# Patient Record
Sex: MALE | Race: WHITE | NOT HISPANIC OR LATINO | Employment: OTHER | URBAN - METROPOLITAN AREA
[De-identification: names, ages, dates, MRNs, and addresses within clinical notes are randomized per-mention and may not be internally consistent; named-entity substitution may affect disease eponyms.]

---

## 2017-02-09 ENCOUNTER — OFFICE VISIT (OUTPATIENT)
Dept: RETAIL CLINIC | Facility: CLINIC | Age: 72
End: 2017-02-09

## 2017-02-09 VITALS
HEIGHT: 69 IN | BODY MASS INDEX: 29.21 KG/M2 | WEIGHT: 197.2 LBS | RESPIRATION RATE: 20 BRPM | DIASTOLIC BLOOD PRESSURE: 68 MMHG | SYSTOLIC BLOOD PRESSURE: 128 MMHG

## 2017-02-09 DIAGNOSIS — E78.6 LOW HDL (UNDER 40): ICD-10-CM

## 2017-02-09 DIAGNOSIS — E11.65 POORLY CONTROLLED DIABETES MELLITUS (HCC): ICD-10-CM

## 2017-02-09 DIAGNOSIS — E78.1 HIGH BLOOD TRIGLYCERIDES: ICD-10-CM

## 2017-02-09 DIAGNOSIS — Z13.220 ENCOUNTER FOR SCREENING FOR LIPID DISORDER: Primary | ICD-10-CM

## 2017-02-09 LAB
CHOLEST SERPL-MCNC: 185 MG/DL
EXPIRATION DATE: NORMAL
GLUCOSE BLDC GLUCOMTR-MCNC: 272 MG/DL (ref 70–130)
HDLC SERPL-MCNC: 25 MG/DL
LDLC SERPL CALC-MCNC: NORMAL MG/DL
Lab: NORMAL
TRIGL SERPL-MCNC: 500 MG/DL

## 2017-02-09 PROCEDURE — BIOSCRNBEC: Performed by: NURSE PRACTITIONER

## 2017-02-09 NOTE — PROGRESS NOTES
S: Would like cholesterol panel checked. Recently called with lab results and told his total cholesterol was over 1000 and questions it. Has appointment for physical tomorrow with PCP.    O: Well appearing   Vitals:    02/09/17 1433   BP: 128/68   Resp: 20       Results for orders placed or performed in visit on 02/09/17   POC Lipid Panel   Result Value Ref Range    Total Cholesterol 185 mg/dL    Triglycerides 500 mg/dL    HDL Cholesterol 25 mg/dL    LDL Cholesterol   mg/dL    Lot Number [id number]     Expiration Date 06/20/2017    POC Glucose Fingerstick   Result Value Ref Range    Glucose 272 (A) 70 - 130 mg/dL     TC/HDL  7.5  Non HDL  160    A:Elevated triglycerides, low HDL with hx of CAD     Poorly controlled diabetes    P: Results discussed Follow up with PCP as scheduled.

## 2020-12-14 ENCOUNTER — TELEPHONE (OUTPATIENT)
Dept: ENDOCRINOLOGY | Facility: CLINIC | Age: 75
End: 2020-12-14

## 2020-12-14 NOTE — TELEPHONE ENCOUNTER
Patient called and had to cancel due to feeling sick. Said he recently had his a1c drawn, and it was around 6.3, but that we could call his provider and have those results sent to us. He said to call them at [phone]

## 2020-12-30 ENCOUNTER — TRANSCRIBE ORDERS (OUTPATIENT)
Dept: ADMINISTRATIVE | Facility: HOSPITAL | Age: 75
End: 2020-12-30

## 2020-12-30 DIAGNOSIS — I20.0 UNSTABLE ANGINA (HCC): Primary | ICD-10-CM

## 2020-12-30 DIAGNOSIS — R94.39 ABNORMAL STRESS TEST: ICD-10-CM

## 2021-01-02 ENCOUNTER — APPOINTMENT (OUTPATIENT)
Dept: PREADMISSION TESTING | Facility: HOSPITAL | Age: 76
End: 2021-01-02

## 2021-01-02 PROCEDURE — U0004 COV-19 TEST NON-CDC HGH THRU: HCPCS

## 2021-01-02 PROCEDURE — C9803 HOPD COVID-19 SPEC COLLECT: HCPCS

## 2021-01-03 LAB — SARS-COV-2 RNA RESP QL NAA+PROBE: NOT DETECTED

## 2021-01-04 ENCOUNTER — HOSPITAL ENCOUNTER (OUTPATIENT)
Facility: HOSPITAL | Age: 76
Setting detail: HOSPITAL OUTPATIENT SURGERY
Discharge: HOME | End: 2021-01-04
Attending: INTERNAL MEDICINE | Admitting: INTERNAL MEDICINE

## 2021-01-04 VITALS
DIASTOLIC BLOOD PRESSURE: 95 MMHG | OXYGEN SATURATION: 94 % | RESPIRATION RATE: 18 BRPM | BODY MASS INDEX: 30.53 KG/M2 | HEART RATE: 84 BPM | HEIGHT: 69 IN | TEMPERATURE: 97.6 F | SYSTOLIC BLOOD PRESSURE: 131 MMHG | WEIGHT: 206.13 LBS

## 2021-01-04 DIAGNOSIS — R94.39 ABNORMAL STRESS TEST: ICD-10-CM

## 2021-01-04 DIAGNOSIS — I20.0 UNSTABLE ANGINA (HCC): ICD-10-CM

## 2021-01-04 LAB
ANION GAP SERPL CALCULATED.3IONS-SCNC: 12 MMOL/L (ref 5–15)
BUN SERPL-MCNC: 21 MG/DL (ref 8–23)
BUN/CREAT SERPL: 23.1 (ref 7–25)
CALCIUM SPEC-SCNC: 10.1 MG/DL (ref 8.6–10.5)
CHLORIDE SERPL-SCNC: 105 MMOL/L (ref 98–107)
CO2 SERPL-SCNC: 21 MMOL/L (ref 22–29)
CREAT SERPL-MCNC: 0.91 MG/DL (ref 0.76–1.27)
DEPRECATED RDW RBC AUTO: 46 FL (ref 37–54)
ERYTHROCYTE [DISTWIDTH] IN BLOOD BY AUTOMATED COUNT: 13.8 % (ref 12.3–15.4)
GFR SERPL CREATININE-BSD FRML MDRD: 82 ML/MIN/1.73
GLUCOSE BLDC GLUCOMTR-MCNC: 109 MG/DL (ref 70–130)
GLUCOSE SERPL-MCNC: 149 MG/DL (ref 65–99)
HCT VFR BLD AUTO: 45.7 % (ref 37.5–51)
HGB BLD-MCNC: 13.8 G/DL (ref 13–17.7)
MCH RBC QN AUTO: 27.7 PG (ref 26.6–33)
MCHC RBC AUTO-ENTMCNC: 30.2 G/DL (ref 31.5–35.7)
MCV RBC AUTO: 91.6 FL (ref 79–97)
PLATELET # BLD AUTO: 185 10*3/MM3 (ref 140–450)
PMV BLD AUTO: 8.9 FL (ref 6–12)
POTASSIUM SERPL-SCNC: 4.9 MMOL/L (ref 3.5–5.2)
RBC # BLD AUTO: 4.99 10*6/MM3 (ref 4.14–5.8)
SODIUM SERPL-SCNC: 138 MMOL/L (ref 136–145)
WBC # BLD AUTO: 4.29 10*3/MM3 (ref 3.4–10.8)

## 2021-01-04 PROCEDURE — C1887 CATHETER, GUIDING: HCPCS | Performed by: INTERNAL MEDICINE

## 2021-01-04 PROCEDURE — 82962 GLUCOSE BLOOD TEST: CPT

## 2021-01-04 PROCEDURE — 92978 ENDOLUMINL IVUS OCT C 1ST: CPT | Performed by: INTERNAL MEDICINE

## 2021-01-04 PROCEDURE — 80048 BASIC METABOLIC PNL TOTAL CA: CPT | Performed by: INTERNAL MEDICINE

## 2021-01-04 PROCEDURE — 25010000002 BIVALIRUDIN TRIFLUOROACETATE 250 MG RECONSTITUTED SOLUTION 1 EACH VIAL: Performed by: INTERNAL MEDICINE

## 2021-01-04 PROCEDURE — 93005 ELECTROCARDIOGRAM TRACING: CPT | Performed by: INTERNAL MEDICINE

## 2021-01-04 PROCEDURE — 92937 PRQ TRLUML REVSC CAB GRF 1: CPT | Performed by: INTERNAL MEDICINE

## 2021-01-04 PROCEDURE — 25010000002 MIDAZOLAM PER 1 MG: Performed by: INTERNAL MEDICINE

## 2021-01-04 PROCEDURE — C1753 CATH, INTRAVAS ULTRASOUND: HCPCS | Performed by: INTERNAL MEDICINE

## 2021-01-04 PROCEDURE — C1725 CATH, TRANSLUMIN NON-LASER: HCPCS | Performed by: INTERNAL MEDICINE

## 2021-01-04 PROCEDURE — C1894 INTRO/SHEATH, NON-LASER: HCPCS | Performed by: INTERNAL MEDICINE

## 2021-01-04 PROCEDURE — 85027 COMPLETE CBC AUTOMATED: CPT | Performed by: INTERNAL MEDICINE

## 2021-01-04 PROCEDURE — C1769 GUIDE WIRE: HCPCS | Performed by: INTERNAL MEDICINE

## 2021-01-04 PROCEDURE — 25010000002 HEPARIN (PORCINE) PER 1000 UNITS: Performed by: INTERNAL MEDICINE

## 2021-01-04 PROCEDURE — 25010000002 FENTANYL CITRATE (PF) 100 MCG/2ML SOLUTION: Performed by: INTERNAL MEDICINE

## 2021-01-04 PROCEDURE — 93459 L HRT ART/GRFT ANGIO: CPT | Performed by: INTERNAL MEDICINE

## 2021-01-04 PROCEDURE — 0 IOPAMIDOL PER 1 ML: Performed by: INTERNAL MEDICINE

## 2021-01-04 RX ORDER — ISOSORBIDE MONONITRATE 30 MG/1
60 TABLET, EXTENDED RELEASE ORAL DAILY
COMMUNITY
End: 2022-03-24

## 2021-01-04 RX ORDER — SODIUM CHLORIDE 9 MG/ML
250 INJECTION, SOLUTION INTRAVENOUS CONTINUOUS
Status: ACTIVE | OUTPATIENT
Start: 2021-01-04 | End: 2021-01-04

## 2021-01-04 RX ORDER — RANOLAZINE 500 MG/1
1000 TABLET, EXTENDED RELEASE ORAL 2 TIMES DAILY
Qty: 60 TABLET | Refills: 5 | Status: ON HOLD | OUTPATIENT
Start: 2021-01-04 | End: 2021-06-25

## 2021-01-04 RX ORDER — CLOPIDOGREL BISULFATE 75 MG/1
75 TABLET ORAL DAILY
Qty: 30 TABLET | Refills: 11 | Status: SHIPPED | OUTPATIENT
Start: 2021-01-04

## 2021-01-04 RX ORDER — ASPIRIN 81 MG/1
81 TABLET ORAL DAILY
COMMUNITY

## 2021-01-04 RX ORDER — FENTANYL CITRATE 50 UG/ML
INJECTION, SOLUTION INTRAMUSCULAR; INTRAVENOUS AS NEEDED
Status: DISCONTINUED | OUTPATIENT
Start: 2021-01-04 | End: 2021-01-04 | Stop reason: HOSPADM

## 2021-01-04 RX ORDER — MIDAZOLAM HYDROCHLORIDE 1 MG/ML
INJECTION INTRAMUSCULAR; INTRAVENOUS AS NEEDED
Status: DISCONTINUED | OUTPATIENT
Start: 2021-01-04 | End: 2021-01-04 | Stop reason: HOSPADM

## 2021-01-04 RX ORDER — POTASSIUM CHLORIDE 750 MG/1
20 TABLET, FILM COATED, EXTENDED RELEASE ORAL DAILY
Status: ON HOLD | COMMUNITY
End: 2021-06-25

## 2021-01-04 RX ORDER — GABAPENTIN 800 MG/1
800 TABLET ORAL 4 TIMES DAILY
COMMUNITY

## 2021-01-04 RX ORDER — CARVEDILOL 3.12 MG/1
3.12 TABLET ORAL 2 TIMES DAILY WITH MEALS
COMMUNITY

## 2021-01-04 RX ORDER — RANOLAZINE 500 MG/1
500 TABLET, EXTENDED RELEASE ORAL 2 TIMES DAILY
Status: ON HOLD | COMMUNITY
End: 2021-01-04 | Stop reason: SDUPTHER

## 2021-01-04 RX ORDER — CLOPIDOGREL BISULFATE 75 MG/1
TABLET ORAL AS NEEDED
Status: DISCONTINUED | OUTPATIENT
Start: 2021-01-04 | End: 2021-01-04 | Stop reason: HOSPADM

## 2021-01-04 RX ORDER — TEMAZEPAM 7.5 MG/1
7.5 CAPSULE ORAL NIGHTLY PRN
Status: DISCONTINUED | OUTPATIENT
Start: 2021-01-04 | End: 2021-01-04 | Stop reason: HOSPADM

## 2021-01-04 RX ORDER — OMEPRAZOLE 20 MG/1
20 CAPSULE, DELAYED RELEASE ORAL DAILY
Status: ON HOLD | COMMUNITY
End: 2021-06-25

## 2021-01-04 RX ORDER — LEVOTHYROXINE SODIUM 0.05 MG/1
50 TABLET ORAL DAILY
Status: ON HOLD | COMMUNITY
End: 2021-06-25

## 2021-01-04 RX ORDER — HYDROCODONE BITARTRATE AND ACETAMINOPHEN 10; 325 MG/1; MG/1
1 TABLET ORAL 3 TIMES DAILY
COMMUNITY

## 2021-01-04 RX ORDER — DEXTROSE MONOHYDRATE 25 G/50ML
25 INJECTION, SOLUTION INTRAVENOUS
Status: DISCONTINUED | OUTPATIENT
Start: 2021-01-04 | End: 2021-01-04 | Stop reason: HOSPADM

## 2021-01-04 RX ORDER — ASPIRIN 325 MG
325 TABLET, DELAYED RELEASE (ENTERIC COATED) ORAL DAILY
Status: DISCONTINUED | OUTPATIENT
Start: 2021-01-04 | End: 2021-01-04 | Stop reason: HOSPADM

## 2021-01-04 RX ORDER — ATORVASTATIN CALCIUM 80 MG/1
80 TABLET, FILM COATED ORAL NIGHTLY
COMMUNITY

## 2021-01-04 RX ORDER — LISINOPRIL 2.5 MG/1
2.5 TABLET ORAL DAILY
COMMUNITY
End: 2022-03-24 | Stop reason: SDUPTHER

## 2021-01-04 RX ORDER — HYDROCODONE BITARTRATE AND ACETAMINOPHEN 5; 325 MG/1; MG/1
1 TABLET ORAL EVERY 4 HOURS PRN
Status: DISCONTINUED | OUTPATIENT
Start: 2021-01-04 | End: 2021-01-04 | Stop reason: HOSPADM

## 2021-01-04 RX ORDER — NICOTINE POLACRILEX 4 MG
15 LOZENGE BUCCAL
Status: DISCONTINUED | OUTPATIENT
Start: 2021-01-04 | End: 2021-01-04 | Stop reason: HOSPADM

## 2021-01-04 RX ORDER — ACETAMINOPHEN 325 MG/1
650 TABLET ORAL EVERY 4 HOURS PRN
Status: DISCONTINUED | OUTPATIENT
Start: 2021-01-04 | End: 2021-01-04 | Stop reason: HOSPADM

## 2021-01-04 RX ORDER — ALPRAZOLAM 0.25 MG/1
0.25 TABLET ORAL 3 TIMES DAILY PRN
Status: DISCONTINUED | OUTPATIENT
Start: 2021-01-04 | End: 2021-01-04 | Stop reason: HOSPADM

## 2021-01-04 RX ORDER — LIDOCAINE HYDROCHLORIDE 10 MG/ML
INJECTION, SOLUTION EPIDURAL; INFILTRATION; INTRACAUDAL; PERINEURAL AS NEEDED
Status: DISCONTINUED | OUTPATIENT
Start: 2021-01-04 | End: 2021-01-04 | Stop reason: HOSPADM

## 2021-01-04 RX ADMIN — ASPIRIN 325 MG: 325 TABLET, COATED ORAL at 13:12

## 2021-01-04 NOTE — H&P
Pre-Cardiac Catheterization Report  Cardiovascular Laboratory  [facility]      Patient:  Remington Alvarado  :  1945  PCP:  Provider, No Known  PHONE:  None    DATE: 2021    BRIEF HPI:  Remington Alvarado is a 75 y.o. male with hypertension, hypercholesterolemia, GERD, diabetes mellitus, and known coronary artery disease.  He is post previous four-vessel and 5 vessel CABG's.  He is complaining of chest pain that is been increasing for the past several weeks he describes it as a pressure.  He states it is moderate severity lasting minutes with associated shortness of breath and dyspnea on exertion.  His symptoms increased with exertion and are decreased with rest.  He recently underwent an abnormal stress test and now presents for left heart catheterization with possible intervention.    Cardiac Risk Factors:  advanced age (older than 55 for men, 65 for women), diabetes mellitus, dyslipidemia, family history of premature cardiovascular disease, hypertension, male gender    Anginal class in last 2 weeks:  CCS class III    CHF Class in last 2 weeks:  NYHA Class II    Cardiogenic shock:  no    Cardiac arrest <24 hours:  no    Stress test within last 6 months:   yes   Details:    Previous cardiac catheterization:  yes  Details:     Previous CABG:  yes  Details:      Allergies:     IV contrast allergy:  no  Allergies   Allergen Reactions   • Sulfa Antibiotics        MEDICATIONS:  Prior to Admission medications    Medication Sig Start Date End Date Taking? Authorizing Provider   acyclovir (ZOVIRAX) 400 MG tablet Take 1 tablet by mouth 3 (Three) Times a Day. Take no more than 5 doses a day. 20   Gita Galindo APRN   Empagliflozin (JARDIANCE PO) Take  by mouth.    Emergency, Nurse Epic, RN   insulin NPH-insulin regular (humuLIN 70/30,novoLIN 70/30) (70-30) 100 UNIT/ML injection Inject  under the skin into the appropriate area as directed 2 (Two) Times a Day With Meals.    Emergency, Nurse Dima RN    metFORMIN (GLUCOPHAGE) 1000 MG tablet Take 1 tablet by mouth 2 (Two) Times a Day With Meals. 12/29/20   Tobias Fay MD   Miracle mouthwash oral suspension Swish and spit 10 mL Every 4 (Four) Hours As Needed for Stomatitis. 9/6/20   Gita Galindo APRN       Past medical & surgical history, social and family history reviewed in the electronic medical record.    ROS:  Cardiovascular ROS: positive for - chest pain, dyspnea on exertion and shortness of breath    Physical Exam:    Vitals: There were no vitals filed for this visit. There were no vitals filed for this visit.    General Appearance:    Alert, cooperative, in no acute distress   Head:    Normocephalic, without obvious abnormality, atraumatic   Eyes:            Lids and lashes normal, conjunctivae and sclerae normal, no   icterus, no pallor, corneas clear, PERRLA   Ears:    Ears appear intact with no abnormalities noted   Neck:   No adenopathy, supple, trachea midline, no thyromegaly, +   carotid bruits, no JVD   Back:     No kyphosis present, no scoliosis present, range of motion normal   Lungs:     Clear to auscultation,respirations regular, even and                unlabored    Heart:    Regular rhythm and normal rate, normal S1 and S2, no         murmur, no gallop, no rub, no click.  Lateral PMI   Chest Wall:    No abnormalities observed   Abdomen:     Normal bowel sounds, no masses, no organomegaly, soft     nontender, nondistended, no guarding, no rebound                tenderness   Rectal:     Deferred   Extremities:   Moves all extremities well, no edema, no cyanosis, no           redness   Pulses:   Pulses palpable and equal bilaterally   Skin:   No bleeding, bruising or rash   Neurologic:   Cranial nerves 2 - 12 grossly intact, sensation intact     Barbaeu Test:  Left: Normal  (oxymetric Allens) Right: Not Assessed             Lab Results   Component Value Date    TRIG 500 02/09/2017    HDL 25 02/09/2017           Impression       · Abnormal stress test    Plan     · Procedure to perform: [facility]  · Planned access: Left radial artery              SCOTT Valente  01/04/21  12:35 EST

## 2021-01-05 ENCOUNTER — DOCUMENTATION (OUTPATIENT)
Dept: CARDIAC REHAB | Facility: HOSPITAL | Age: 76
End: 2021-01-05

## 2021-01-05 ENCOUNTER — CALL CENTER PROGRAMS (OUTPATIENT)
Dept: CALL CENTER | Facility: HOSPITAL | Age: 76
End: 2021-01-05

## 2021-01-05 NOTE — PROGRESS NOTES
Pt. Referred for Phase II Cardiac Rehab. Staff discussed benefits of exercise, program protocol, and educational material provided. Teach back verified.  Patient states that he will contact staff at a later date to schedule an appointment.

## 2021-01-05 NOTE — OUTREACH NOTE
PCI/Device Survey      Responses   Facility patient discharged from?  [facility]   Procedure date  01/04/21   Procedure (if device, specify in description)  PCI   PCI site  Left, Arm   Performing MD Dr. Man Arreguin   Attempt successful?  Yes   Call start time  1138   Call end time  1148   Has the patient had any of the following symptoms since discharge?  -- [Denies any symptoms. ]   Is the patient taking prescribed medications:  ASA, Plavix [patient reports that he will  the plavix from pharmacy shortly. ]   Nursing intervention  Reminded to continue to take prescribed medications   Does the patient have any of the following symptoms related to the cath/surgical site?  -- [Patient reports dressing remains clean and intact this am. ]   Does the patient have an appointment scheduled with the cardiologist?  No   Appointment comments  Patient reports waiting to hear from Dr Arreguin's office with appt.    If the patient is a current smoker, are they able to teach back resources for cessation?  Not a smoker   Did the patient feel prepared to go home on the same day as the procedure?  Yes   Is the patient satisfied with the same day discharge process?  Yes   PCI/Device call completed  Yes   Wrap up additional comments  Patient reports that he is doing well. Denies any questions or concerns today.           Chastity Chetser RN

## 2021-01-06 LAB
QT INTERVAL: 356 MS
QTC INTERVAL: 410 MS

## 2021-01-15 RX ORDER — EMPAGLIFLOZIN 25 MG/1
25 TABLET, FILM COATED ORAL DAILY
Qty: 30 TABLET | Refills: 3 | Status: SHIPPED | OUTPATIENT
Start: 2021-01-15 | End: 2021-04-27

## 2021-04-27 RX ORDER — EMPAGLIFLOZIN 25 MG/1
1 TABLET, FILM COATED ORAL DAILY
Qty: 90 TABLET | Refills: 0 | Status: SHIPPED | OUTPATIENT
Start: 2021-04-27 | End: 2021-07-26

## 2021-06-21 ENCOUNTER — TRANSCRIBE ORDERS (OUTPATIENT)
Dept: ADMINISTRATIVE | Facility: HOSPITAL | Age: 76
End: 2021-06-21

## 2021-06-21 DIAGNOSIS — R94.39 ABNORMAL STRESS TEST: Primary | ICD-10-CM

## 2021-06-25 ENCOUNTER — HOSPITAL ENCOUNTER (OUTPATIENT)
Facility: HOSPITAL | Age: 76
Setting detail: HOSPITAL OUTPATIENT SURGERY
Discharge: HOME | End: 2021-06-25
Attending: INTERNAL MEDICINE | Admitting: INTERNAL MEDICINE

## 2021-06-25 VITALS
HEART RATE: 66 BPM | RESPIRATION RATE: 18 BRPM | HEIGHT: 69 IN | TEMPERATURE: 97 F | WEIGHT: 208.11 LBS | OXYGEN SATURATION: 94 % | SYSTOLIC BLOOD PRESSURE: 152 MMHG | BODY MASS INDEX: 30.82 KG/M2 | DIASTOLIC BLOOD PRESSURE: 89 MMHG

## 2021-06-25 DIAGNOSIS — R94.39 ABNORMAL STRESS TEST: ICD-10-CM

## 2021-06-25 LAB
ANION GAP SERPL CALCULATED.3IONS-SCNC: 10 MMOL/L (ref 5–15)
BUN SERPL-MCNC: 17 MG/DL (ref 8–23)
BUN/CREAT SERPL: 17.2 (ref 7–25)
CALCIUM SPEC-SCNC: 9.5 MG/DL (ref 8.6–10.5)
CHLORIDE SERPL-SCNC: 102 MMOL/L (ref 98–107)
CO2 SERPL-SCNC: 24 MMOL/L (ref 22–29)
CREAT SERPL-MCNC: 0.99 MG/DL (ref 0.76–1.27)
DEPRECATED RDW RBC AUTO: 46.2 FL (ref 37–54)
ERYTHROCYTE [DISTWIDTH] IN BLOOD BY AUTOMATED COUNT: 13.2 % (ref 12.3–15.4)
GFR SERPL CREATININE-BSD FRML MDRD: 74 ML/MIN/1.73
GLUCOSE BLDC GLUCOMTR-MCNC: 111 MG/DL (ref 70–130)
GLUCOSE SERPL-MCNC: 144 MG/DL (ref 65–99)
HCT VFR BLD AUTO: 42.5 % (ref 37.5–51)
HGB BLD-MCNC: 13.4 G/DL (ref 13–17.7)
MCH RBC QN AUTO: 29.7 PG (ref 26.6–33)
MCHC RBC AUTO-ENTMCNC: 31.5 G/DL (ref 31.5–35.7)
MCV RBC AUTO: 94.2 FL (ref 79–97)
PLATELET # BLD AUTO: 170 10*3/MM3 (ref 140–450)
PMV BLD AUTO: 9.2 FL (ref 6–12)
POTASSIUM SERPL-SCNC: 4.6 MMOL/L (ref 3.5–5.2)
RBC # BLD AUTO: 4.51 10*6/MM3 (ref 4.14–5.8)
SODIUM SERPL-SCNC: 136 MMOL/L (ref 136–145)
WBC # BLD AUTO: 4.29 10*3/MM3 (ref 3.4–10.8)

## 2021-06-25 PROCEDURE — C1887 CATHETER, GUIDING: HCPCS | Performed by: INTERNAL MEDICINE

## 2021-06-25 PROCEDURE — 92978 ENDOLUMINL IVUS OCT C 1ST: CPT | Performed by: INTERNAL MEDICINE

## 2021-06-25 PROCEDURE — C1769 GUIDE WIRE: HCPCS | Performed by: INTERNAL MEDICINE

## 2021-06-25 PROCEDURE — 80048 BASIC METABOLIC PNL TOTAL CA: CPT

## 2021-06-25 PROCEDURE — 25010000002 FENTANYL CITRATE (PF) 50 MCG/ML SOLUTION: Performed by: INTERNAL MEDICINE

## 2021-06-25 PROCEDURE — C1725 CATH, TRANSLUMIN NON-LASER: HCPCS | Performed by: INTERNAL MEDICINE

## 2021-06-25 PROCEDURE — 25010000002 BIVALIRUDIN TRIFLUOROACETATE 250 MG RECONSTITUTED SOLUTION 1 EACH VIAL: Performed by: INTERNAL MEDICINE

## 2021-06-25 PROCEDURE — 82962 GLUCOSE BLOOD TEST: CPT

## 2021-06-25 PROCEDURE — C1894 INTRO/SHEATH, NON-LASER: HCPCS | Performed by: INTERNAL MEDICINE

## 2021-06-25 PROCEDURE — 0 IOPAMIDOL PER 1 ML: Performed by: INTERNAL MEDICINE

## 2021-06-25 PROCEDURE — 93455 CORONARY ART/GRFT ANGIO S&I: CPT | Performed by: INTERNAL MEDICINE

## 2021-06-25 PROCEDURE — C1753 CATH, INTRAVAS ULTRASOUND: HCPCS | Performed by: INTERNAL MEDICINE

## 2021-06-25 PROCEDURE — C9604 PERC D-E COR REVASC T CABG S: HCPCS | Performed by: INTERNAL MEDICINE

## 2021-06-25 PROCEDURE — 85027 COMPLETE CBC AUTOMATED: CPT

## 2021-06-25 PROCEDURE — 93005 ELECTROCARDIOGRAM TRACING: CPT | Performed by: INTERNAL MEDICINE

## 2021-06-25 PROCEDURE — 25010000002 MIDAZOLAM PER 1 MG: Performed by: INTERNAL MEDICINE

## 2021-06-25 RX ORDER — HYDROCODONE BITARTRATE AND ACETAMINOPHEN 5; 325 MG/1; MG/1
1 TABLET ORAL EVERY 4 HOURS PRN
Status: DISCONTINUED | OUTPATIENT
Start: 2021-06-25 | End: 2021-06-25 | Stop reason: HOSPADM

## 2021-06-25 RX ORDER — ALPRAZOLAM 0.25 MG/1
0.25 TABLET ORAL 3 TIMES DAILY PRN
Status: DISCONTINUED | OUTPATIENT
Start: 2021-06-25 | End: 2021-06-25 | Stop reason: HOSPADM

## 2021-06-25 RX ORDER — LIDOCAINE HYDROCHLORIDE 10 MG/ML
INJECTION, SOLUTION EPIDURAL; INFILTRATION; INTRACAUDAL; PERINEURAL AS NEEDED
Status: DISCONTINUED | OUTPATIENT
Start: 2021-06-25 | End: 2021-06-25 | Stop reason: HOSPADM

## 2021-06-25 RX ORDER — ACETAMINOPHEN 325 MG/1
650 TABLET ORAL EVERY 4 HOURS PRN
Status: DISCONTINUED | OUTPATIENT
Start: 2021-06-25 | End: 2021-06-25 | Stop reason: HOSPADM

## 2021-06-25 RX ORDER — OMEPRAZOLE 40 MG/1
40 CAPSULE, DELAYED RELEASE ORAL DAILY
COMMUNITY

## 2021-06-25 RX ORDER — RANOLAZINE 1000 MG/1
1000 TABLET, EXTENDED RELEASE ORAL 2 TIMES DAILY
COMMUNITY

## 2021-06-25 RX ORDER — TEMAZEPAM 7.5 MG/1
7.5 CAPSULE ORAL NIGHTLY PRN
Status: DISCONTINUED | OUTPATIENT
Start: 2021-06-25 | End: 2021-06-25 | Stop reason: HOSPADM

## 2021-06-25 RX ORDER — LEVOTHYROXINE SODIUM 0.07 MG/1
75 TABLET ORAL DAILY
COMMUNITY
Start: 2021-04-16 | End: 2021-07-27 | Stop reason: SDUPTHER

## 2021-06-25 RX ORDER — ZOLPIDEM TARTRATE 5 MG/1
5 TABLET ORAL NIGHTLY PRN
COMMUNITY
Start: 2021-04-12

## 2021-06-25 RX ORDER — POTASSIUM CHLORIDE 20 MEQ/1
20 TABLET, EXTENDED RELEASE ORAL DAILY
COMMUNITY
Start: 2021-04-01

## 2021-06-25 RX ORDER — EZETIMIBE 10 MG/1
10 TABLET ORAL DAILY
COMMUNITY
Start: 2021-04-27

## 2021-06-25 RX ORDER — DIAZEPAM 2 MG/1
2 TABLET ORAL ONCE
Status: COMPLETED | OUTPATIENT
Start: 2021-06-25 | End: 2021-06-25

## 2021-06-25 RX ORDER — ASPIRIN 325 MG
325 TABLET, DELAYED RELEASE (ENTERIC COATED) ORAL DAILY
Status: DISCONTINUED | OUTPATIENT
Start: 2021-06-25 | End: 2021-06-25 | Stop reason: HOSPADM

## 2021-06-25 RX ORDER — NITROGLYCERIN 0.4 MG/1
0.4 TABLET SUBLINGUAL
COMMUNITY
Start: 2021-04-06

## 2021-06-25 RX ORDER — DEXTROSE MONOHYDRATE 25 G/50ML
25 INJECTION, SOLUTION INTRAVENOUS
Status: DISCONTINUED | OUTPATIENT
Start: 2021-06-25 | End: 2021-06-25 | Stop reason: HOSPADM

## 2021-06-25 RX ORDER — FENTANYL CITRATE 50 UG/ML
INJECTION, SOLUTION INTRAMUSCULAR; INTRAVENOUS AS NEEDED
Status: DISCONTINUED | OUTPATIENT
Start: 2021-06-25 | End: 2021-06-25 | Stop reason: HOSPADM

## 2021-06-25 RX ORDER — MIDAZOLAM HYDROCHLORIDE 1 MG/ML
INJECTION INTRAMUSCULAR; INTRAVENOUS AS NEEDED
Status: DISCONTINUED | OUTPATIENT
Start: 2021-06-25 | End: 2021-06-25 | Stop reason: HOSPADM

## 2021-06-25 RX ORDER — SODIUM CHLORIDE 9 MG/ML
250 INJECTION, SOLUTION INTRAVENOUS CONTINUOUS
Status: ACTIVE | OUTPATIENT
Start: 2021-06-25 | End: 2021-06-25

## 2021-06-25 RX ORDER — NICOTINE POLACRILEX 4 MG
15 LOZENGE BUCCAL
Status: DISCONTINUED | OUTPATIENT
Start: 2021-06-25 | End: 2021-06-25 | Stop reason: HOSPADM

## 2021-06-25 RX ADMIN — HYDROCODONE BITARTRATE AND ACETAMINOPHEN 1 TABLET: 5; 325 TABLET ORAL at 10:52

## 2021-06-25 RX ADMIN — ASPIRIN 325 MG: 325 TABLET, COATED ORAL at 07:55

## 2021-06-25 RX ADMIN — DIAZEPAM 2 MG: 2 TABLET ORAL at 07:57

## 2021-06-25 NOTE — H&P
Pre-Cardiac Catheterization Report  Cardiovascular Laboratory  [facility]      Patient:  Remington Alvarado  :  1945  PCP:  Provider, No Known  PHONE:  None    DATE: 2021    BRIEF HPI:  Remington Alvarado is a 75 y.o. male with hypertension, hypercholesterolemia, diabetes mellitus, family history of coronary artery disease, and known coronary artery disease.  He is post previous CABG.  He has been complaining of chest pain which she describes as tight.  He states it is moderate in severity lasting minutes with associated shortness of breath and dyspnea on exertion.  He recently underwent an abnormal stress test and now presents for left heart catheterization with possible intervention.    Cardiac Risk Factors:  advanced age (older than 55 for men, 65 for women), diabetes mellitus, dyslipidemia, family history of premature cardiovascular disease, hypertension, male gender    Anginal class in last 2 weeks:  CCS class II    CHF Class in last 2 weeks:  NYHA Class II    Cardiogenic shock:  no    Cardiac arrest <24 hours:  no    Stress test within last 6 months:   yes   Details:    Previous cardiac catheterization:  yes  Details:     Previous CABG:  yes  Details:      Allergies:     IV contrast allergy:  no  Allergies   Allergen Reactions   • Sulfa Antibiotics        MEDICATIONS:  Prior to Admission medications    Medication Sig Start Date End Date Taking? Authorizing Provider   acyclovir (ZOVIRAX) 400 MG tablet Take 1 tablet by mouth 3 (Three) Times a Day. Take no more than 5 doses a day. 20   Gita Galindo APRN   aspirin 81 MG EC tablet Take 81 mg by mouth Daily.    Provider, MD Travis   atorvastatin (LIPITOR) 80 MG tablet Take 80 mg by mouth Daily.    Provider, MD Travis   carvedilol (COREG) 3.125 MG tablet Take 3.125 mg by mouth 2 (Two) Times a Day With Meals.    Provider, MD Travis   clopidogrel (PLAVIX) 75 MG tablet Take 1 tablet by mouth Daily. 21   Martin Arreguin  MD Man   Empagliflozin (Jardiance) 25 MG tablet Take 25 mg by mouth Daily. NEEDS AN APPT FOR REFILLS 4/27/21   Tobias Fay MD   gabapentin (NEURONTIN) 800 MG tablet Take 800 mg by mouth 4 (Four) Times a Day.    Travis Portillo MD   HYDROcodone-acetaminophen (NORCO)  MG per tablet Take 1 tablet by mouth 3 (Three) Times a Day.    Travis Portillo MD   insulin NPH-insulin regular (humuLIN 70/30,novoLIN 70/30) (70-30) 100 UNIT/ML injection Inject 68 Units under the skin into the appropriate area as directed 2 (Two) Times a Day With Meals.    Emergency, Nurse Epic, RN   isosorbide mononitrate (IMDUR) 30 MG 24 hr tablet Take 30 mg by mouth Daily.    Travis Portillo MD   levothyroxine (SYNTHROID, LEVOTHROID) 50 MCG tablet Take 50 mcg by mouth Daily.    Travis Portillo MD   lisinopril (PRINIVIL,ZESTRIL) 2.5 MG tablet Take 2.5 mg by mouth Daily.    Travis Portillo MD   metFORMIN (GLUCOPHAGE) 1000 MG tablet TAKE 1 TABLET BY MOUTH TWICE DAILY WITH MEALS 1/29/21   Tobias Fay MD   Miracle mouthwash oral suspension Swish and spit 10 mL Every 4 (Four) Hours As Needed for Stomatitis. 9/6/20   Gita Galindo APRN   omeprazole (priLOSEC) 20 MG capsule Take 20 mg by mouth Daily.    Travis Portillo MD   potassium chloride 10 MEQ CR tablet Take 20 mEq by mouth Daily.    Travis Portillo MD   ranolazine (RANEXA) 500 MG 12 hr tablet Take 2 tablets by mouth 2 (Two) Times a Day. 1/4/21   Martin Arreguin MD       Past medical & surgical history, social and family history reviewed in the electronic medical record.    ROS:  Cardiovascular ROS: positive for - chest pain, dyspnea on exertion and shortness of breath    Physical Exam:    Vitals: There were no vitals filed for this visit. There were no vitals filed for this visit.    General Appearance:    Alert, cooperative, in no acute distress   Head:    Normocephalic, without obvious abnormality, atraumatic    Eyes:            Lids and lashes normal, conjunctivae and sclerae normal, no   icterus, no pallor, corneas clear, PERRLA   Ears:    Ears appear intact with no abnormalities noted   Neck:   No adenopathy, supple, trachea midline, no thyromegaly, no   carotid bruit, no JVD   Back:     No kyphosis present, no scoliosis present, range of motion normal   Lungs:     Clear to auscultation,respirations regular, even and                unlabored    Heart:    Regular rhythm and normal rate, normal S1 and S2, no         murmur, no gallop, no rub, no click   Chest Wall:    No abnormalities observed   Abdomen:     Normal bowel sounds, no masses, no organomegaly, soft     nontender, nondistended, no guarding, no rebound                tenderness   Rectal:     Deferred   Extremities:   Moves all extremities well, no edema, no cyanosis, no           redness   Pulses:   Pulses palpable and equal bilaterally   Skin:   No bleeding, bruising or rash   Neurologic:   Cranial nerves 2 - 12 grossly intact, sensation intact     Barbaeu Test:  Left: Normal  (oxymetric Allens) Right: Not Assessed             Lab Results   Component Value Date    TRIG 500 02/09/2017    HDL 25 02/09/2017           Impression      · Abnormal stress test    Plan     · Procedure to perform: [facility]  · Planned access: Left radial artery              SCOTT Valente   06/25/21  07:09 EDT

## 2021-06-28 ENCOUNTER — CALL CENTER PROGRAMS (OUTPATIENT)
Dept: CALL CENTER | Facility: HOSPITAL | Age: 76
End: 2021-06-28

## 2021-06-28 LAB
QT INTERVAL: 406 MS
QTC INTERVAL: 431 MS

## 2021-06-28 NOTE — OUTREACH NOTE
PCI/Device Survey      Responses   Facility patient discharged from?  [facility]   Procedure date  06/25/21   Procedure (if device, specify in description)  PCI   PCI site  Left, Arm   Performing MD Dr. Man Arreguin   Attempt successful?  Yes   Call start time  1219   Call end time  1222   Nursing interventions  Advised to call MD, Advised to call 911   Is the patient taking prescribed medications:  ASA, Plavix   Nursing intervention  Reminded to continue to take prescribed medications, Nurse provided patient education   Does the patient have any of the following symptoms related to the cath/surgical site?  Bruising, Unusal pain at the site, Redness, warmth, or swelling at the site, Drainage (other than a small amount of blood on the dressing), Bleeding that does not stop after 30 minutes of holding steady pressure on the site, Arm, hand or leg pale, cool, tingly or numb, Fever   Nursing intervention  Patient education provided   Does the patient have an appointment scheduled with the cardiologist?  Yes   Appointment comments  Office will with appt.   If the patient is a current smoker, are they able to teach back resources for cessation?  Not a smoker   Did the patient feel prepared to go home on the same day as the procedure?  Yes   Is the patient satisfied with the same day discharge process?  Yes   Discharge process comments  Not any issues he says.  Everybody did a great job!   PCI/Device call completed  Yes   Wrap up additional comments  He is doing well. Says the sight looks wonderful.          Aubree Haider RN

## 2021-06-29 ENCOUNTER — DOCUMENTATION (OUTPATIENT)
Dept: CARDIAC REHAB | Facility: HOSPITAL | Age: 76
End: 2021-06-29

## 2021-06-29 NOTE — PROGRESS NOTES
Pt. Referred for Phase II Cardiac Rehab. Staff called patient to discuss benefits of exercise and program protocol. Teach back verified.  Patient declined to participate at this time states he walks at home.  Staff discussed home exercise guidelines with patient.  Patient verbalized understanding.  Patient will call to set up orientation if he does decide to participate.

## 2021-07-23 ENCOUNTER — TELEPHONE (OUTPATIENT)
Dept: ENDOCRINOLOGY | Facility: CLINIC | Age: 76
End: 2021-07-23

## 2021-07-23 NOTE — TELEPHONE ENCOUNTER
Pt notified of message and verbalized understanding. He was transferred to the front to schedule appt for a PA.

## 2021-07-23 NOTE — TELEPHONE ENCOUNTER
Patient called. He said he will be leaving for [place] in October before he could get a follow up appointment scheduled. He asked if  could put an order in for him to have his lab work done.    Please advise.

## 2021-07-26 ENCOUNTER — LAB (OUTPATIENT)
Dept: LAB | Facility: HOSPITAL | Age: 76
End: 2021-07-26

## 2021-07-26 ENCOUNTER — OFFICE VISIT (OUTPATIENT)
Dept: ENDOCRINOLOGY | Facility: CLINIC | Age: 76
End: 2021-07-26

## 2021-07-26 VITALS
DIASTOLIC BLOOD PRESSURE: 74 MMHG | HEIGHT: 69 IN | BODY MASS INDEX: 30.21 KG/M2 | HEART RATE: 80 BPM | WEIGHT: 204 LBS | SYSTOLIC BLOOD PRESSURE: 140 MMHG

## 2021-07-26 DIAGNOSIS — Z79.4 TYPE 2 DIABETES MELLITUS WITH DIABETIC POLYNEUROPATHY, WITH LONG-TERM CURRENT USE OF INSULIN (HCC): Primary | ICD-10-CM

## 2021-07-26 DIAGNOSIS — E03.9 ACQUIRED HYPOTHYROIDISM: ICD-10-CM

## 2021-07-26 DIAGNOSIS — E11.42 TYPE 2 DIABETES MELLITUS WITH DIABETIC POLYNEUROPATHY, WITH LONG-TERM CURRENT USE OF INSULIN (HCC): Primary | ICD-10-CM

## 2021-07-26 DIAGNOSIS — E11.42 TYPE 2 DIABETES MELLITUS WITH DIABETIC POLYNEUROPATHY, WITH LONG-TERM CURRENT USE OF INSULIN (HCC): ICD-10-CM

## 2021-07-26 DIAGNOSIS — E78.2 MIXED HYPERLIPIDEMIA: ICD-10-CM

## 2021-07-26 DIAGNOSIS — Z79.4 TYPE 2 DIABETES MELLITUS WITH DIABETIC POLYNEUROPATHY, WITH LONG-TERM CURRENT USE OF INSULIN (HCC): ICD-10-CM

## 2021-07-26 LAB
ALBUMIN SERPL-MCNC: 5.1 G/DL (ref 3.5–5.2)
ALBUMIN UR-MCNC: 2 MG/DL
ALBUMIN/GLOB SERPL: 1.7 G/DL
ALP SERPL-CCNC: 58 U/L (ref 39–117)
ALT SERPL W P-5'-P-CCNC: 18 U/L (ref 1–41)
ANION GAP SERPL CALCULATED.3IONS-SCNC: 16.2 MMOL/L (ref 5–15)
AST SERPL-CCNC: 22 U/L (ref 1–40)
BILIRUB SERPL-MCNC: 0.6 MG/DL (ref 0–1.2)
BUN SERPL-MCNC: 13 MG/DL (ref 8–23)
BUN/CREAT SERPL: 12 (ref 7–25)
CALCIUM SPEC-SCNC: 10.1 MG/DL (ref 8.6–10.5)
CHLORIDE SERPL-SCNC: 105 MMOL/L (ref 98–107)
CHOLEST SERPL-MCNC: 100 MG/DL (ref 0–200)
CO2 SERPL-SCNC: 17.8 MMOL/L (ref 22–29)
CREAT SERPL-MCNC: 1.08 MG/DL (ref 0.76–1.27)
CREAT UR-MCNC: 40.3 MG/DL
EXPIRATION DATE: NORMAL
EXPIRATION DATE: NORMAL
GFR SERPL CREATININE-BSD FRML MDRD: 67 ML/MIN/1.73
GLOBULIN UR ELPH-MCNC: 3 GM/DL
GLUCOSE BLDC GLUCOMTR-MCNC: 124 MG/DL (ref 70–130)
GLUCOSE SERPL-MCNC: 124 MG/DL (ref 65–99)
HBA1C MFR BLD: 6.5 %
HDLC SERPL-MCNC: 41 MG/DL (ref 40–60)
LDLC SERPL CALC-MCNC: 29 MG/DL (ref 0–100)
LDLC/HDLC SERPL: 0.53 {RATIO}
Lab: NORMAL
Lab: NORMAL
MICROALBUMIN/CREAT UR: 49.6 MG/G
POTASSIUM SERPL-SCNC: 5 MMOL/L (ref 3.5–5.2)
PROT SERPL-MCNC: 8.1 G/DL (ref 6–8.5)
SODIUM SERPL-SCNC: 139 MMOL/L (ref 136–145)
T4 FREE SERPL-MCNC: 1.23 NG/DL (ref 0.93–1.7)
TRIGL SERPL-MCNC: 186 MG/DL (ref 0–150)
TSH SERPL DL<=0.05 MIU/L-ACNC: 2.34 UIU/ML (ref 0.27–4.2)
VLDLC SERPL-MCNC: 30 MG/DL (ref 5–40)

## 2021-07-26 PROCEDURE — 82947 ASSAY GLUCOSE BLOOD QUANT: CPT | Performed by: PHYSICIAN ASSISTANT

## 2021-07-26 PROCEDURE — 99214 OFFICE O/P EST MOD 30 MIN: CPT | Performed by: PHYSICIAN ASSISTANT

## 2021-07-26 PROCEDURE — 3044F HG A1C LEVEL LT 7.0%: CPT | Performed by: PHYSICIAN ASSISTANT

## 2021-07-26 PROCEDURE — 82570 ASSAY OF URINE CREATININE: CPT

## 2021-07-26 PROCEDURE — 84439 ASSAY OF FREE THYROXINE: CPT

## 2021-07-26 PROCEDURE — 80061 LIPID PANEL: CPT

## 2021-07-26 PROCEDURE — 80053 COMPREHEN METABOLIC PANEL: CPT

## 2021-07-26 PROCEDURE — 83036 HEMOGLOBIN GLYCOSYLATED A1C: CPT | Performed by: PHYSICIAN ASSISTANT

## 2021-07-26 PROCEDURE — 82043 UR ALBUMIN QUANTITATIVE: CPT

## 2021-07-26 PROCEDURE — 84443 ASSAY THYROID STIM HORMONE: CPT

## 2021-07-26 RX ORDER — EMPAGLIFLOZIN 25 MG/1
TABLET, FILM COATED ORAL
Qty: 90 TABLET | Refills: 1 | Status: SHIPPED | OUTPATIENT
Start: 2021-07-26 | End: 2022-02-17

## 2021-07-26 NOTE — PROGRESS NOTES
Office Note      Date: 2021  Patient Name: Remington Alvarado  MRN: [id number]  : 1945    Chief Complaint   Patient presents with   • Diabetes   • Hypothyroidism       History of Present Illness:   Remington Alvarado is a 75 y.o. male who presents for type 2 diabetes.  It has been over a year since his last appointment.  He typically sees Dr. Tobias Fay.  He remains on Jardiance 25 mg daily and Metformin 1000 mg twice a day.  He reports he has been taking Humulin 70/30 insulin 68 units with breakfast and supper.  He reports he has had some trouble with hypoglycemia especially in the evenings recently.  He reports he checks his blood sugars 1-3 times a day.  He reports his blood sugars have been fluctuating recently the highest reading he has had was 175 mg/dL.  He reports he is due for his annual eye exam.  He has had both doses of his Covid vaccine.  He denies any trouble with his feet today he reports he sees his podiatrist every 3 months regularly.  He reports he had some cardiac issues since his appointment 2020.  In January he had a stent placed and last month he was having some issues with chest pain and saw Dr. Arreguin.  He reports his primary care physician increase his levothyroxine dose to 75 mcg daily.  He has been taking this regularly and correctly.  He reports he feels okay from a thyroid standpoint.  He sees a pain doctor regularly for neuropathy pain.    Subjective     Review of Systems:   Review of Systems   Constitutional: Negative.    Cardiovascular: Negative.    Gastrointestinal: Negative.    Endocrine: Negative.    Neurological: Positive for numbness.       The following portions of the patient's history were reviewed and updated as appropriate: allergies, current medications, past family history, past medical history, past social history, past surgical history and problem list.    Objective     Vitals:    21 0848   BP: 140/74   Pulse: 80   Weight: 92.5 kg (204 lb)  "  Height: 175.3 cm (69\")   PainSc:   8     Body mass index is 30.13 kg/m².    Physical Exam  Vitals reviewed.   Constitutional:       General: He is not in acute distress.     Appearance: Normal appearance.   Neck:      Thyroid: No thyroid mass, thyromegaly or thyroid tenderness.      Comments: Thyroid top-normal size on palpation, bosselated  Lymphadenopathy:      Cervical: No cervical adenopathy.   Neurological:      Mental Status: He is alert.         HEMOGLOBIN A1C  Lab Results   Component Value Date    HGBA1C 6.5 07/26/2021       GLUCOSE  Glucose   Date Value Ref Range Status   07/26/2021 124 70 - 130 mg/dL Final           Assessment / Plan      Assessment & Plan:  1. Type 2 diabetes mellitus with diabetic polyneuropathy, with long-term current use of insulin (CMS/[facility])  His A1c today looks good at 6.5%.  He has had some trouble with hypoglycemia in the evenings.  We will reduce his insulin today to Humulin 70/30 65 units with breakfast and 60 units with supper.  I encouraged him to check blood sugars more regularly and send in readings for review and adjustment as needed.  He will continue Jardiance 25 mg daily and Metformin at 1000 mg twice a day.  His systolic blood pressure is up some today his cardiologist takes care of his blood pressure medications.  He sees a pain doctor for neuropathy pain as well as the podiatrist every 3 months.  He denies any problems with his feet today.  His weight is down 8 pounds since his appointment June 2020.  Encouraged healthy eating habits and physical activity as tolerated.  Fasting labs pending today.  Will send note with results and plan.  - POC Glycosylated Hemoglobin (Hb A1C)  - POC Glucose, Blood  - Comprehensive Metabolic Panel; Future  - Lipid Panel; Future  - TSH; Future  - T4, Free; Future  - Microalbumin / Creatinine Urine Ratio - Urine, Clean Catch; Future    2. Acquired hypothyroidism  TFTs pending.  Will send note with results and plan.  For now he will " continue levothyroxine 75 mcg daily.  - TSH; Future  - T4, Free; Future    3. Mixed hyperlipidemia  Fasting labs pending.  Will send note with results and plan.  For now he will continue atorvastatin 80 mg daily.  - Lipid Panel; Future      Return in about 3 months (around 10/26/2021) for Recheck 3-4 mos-- usually sees Dr. Tobias Fay.     This note was dictated using Dragon voice recognition.    Cira Baca PA-C  07/26/2021

## 2021-07-27 RX ORDER — LEVOTHYROXINE SODIUM 0.07 MG/1
75 TABLET ORAL DAILY
Qty: 90 TABLET | Refills: 2 | Status: SHIPPED | OUTPATIENT
Start: 2021-07-27 | End: 2022-03-24 | Stop reason: SDUPTHER

## 2021-10-19 ENCOUNTER — OFFICE VISIT (OUTPATIENT)
Dept: ENDOCRINOLOGY | Facility: CLINIC | Age: 76
End: 2021-10-19

## 2021-10-19 VITALS
OXYGEN SATURATION: 95 % | HEIGHT: 69 IN | SYSTOLIC BLOOD PRESSURE: 112 MMHG | HEART RATE: 84 BPM | WEIGHT: 201.8 LBS | DIASTOLIC BLOOD PRESSURE: 70 MMHG | BODY MASS INDEX: 29.89 KG/M2

## 2021-10-19 DIAGNOSIS — Z79.4 TYPE 2 DIABETES MELLITUS WITH DIABETIC POLYNEUROPATHY, WITH LONG-TERM CURRENT USE OF INSULIN (HCC): Primary | ICD-10-CM

## 2021-10-19 DIAGNOSIS — E11.42 TYPE 2 DIABETES MELLITUS WITH DIABETIC POLYNEUROPATHY, WITH LONG-TERM CURRENT USE OF INSULIN (HCC): Primary | ICD-10-CM

## 2021-10-19 LAB
EXPIRATION DATE: NORMAL
EXPIRATION DATE: NORMAL
GLUCOSE BLDC GLUCOMTR-MCNC: 122 MG/DL (ref 70–130)
HBA1C MFR BLD: 6.3 %
Lab: NORMAL
Lab: NORMAL

## 2021-10-19 PROCEDURE — 3044F HG A1C LEVEL LT 7.0%: CPT | Performed by: PHYSICIAN ASSISTANT

## 2021-10-19 PROCEDURE — 99213 OFFICE O/P EST LOW 20 MIN: CPT | Performed by: PHYSICIAN ASSISTANT

## 2021-10-19 PROCEDURE — 83036 HEMOGLOBIN GLYCOSYLATED A1C: CPT | Performed by: PHYSICIAN ASSISTANT

## 2021-10-19 PROCEDURE — 82947 ASSAY GLUCOSE BLOOD QUANT: CPT | Performed by: PHYSICIAN ASSISTANT

## 2021-10-19 NOTE — PROGRESS NOTES
"     Office Note      Date: 10/19/2021  Patient Name: Remington Alvarado  MRN: [id number]  : 1945    Chief Complaint   Patient presents with   • Diabetes     3 mo f/u        History of Present Illness:   Remington Alvarado is a 76 y.o. male who presents for follow-up for type 2 diabetes.  He reports his blood sugars have been good.  He continues to check these 1-3 times daily depending on how the day is going.  He remains on Jardiance 25 mg daily, Metformin at 1000 mg twice a day and Humulin 70/30 65 units with breakfast and 60 units with supper.  He occasionally takes less insulin if his blood glucose is good and he is not eating a large meal.  He denies any trouble with severe or frequent hypoglycemia.  He reports he continues to see the pain doctor regularly for the neuropathy pain.    He had his Covid vaccine booster as well as his flu vaccine this fall.  He reports he is overdue for his annual eye exam and will get this scheduled.  He continues to see the podiatrist every 3 months for his feet.  He reports he is going to [place] for several weeks to stay and take care of his friend's house.      Subjective     Review of Systems:   Review of Systems   Constitutional: Negative.    Cardiovascular: Negative.    Endocrine: Negative.    Musculoskeletal: Positive for back pain.   Neurological: Positive for numbness.       The following portions of the patient's history were reviewed and updated as appropriate: allergies, current medications, past family history, past medical history, past social history, past surgical history and problem list.    Objective     Vitals:    10/19/21 0927   BP: 112/70   Pulse: 84   SpO2: 95%   Weight: 91.5 kg (201 lb 12.8 oz)   Height: 175.3 cm (69\")     Body mass index is 29.8 kg/m².    Physical Exam  Vitals reviewed.   Constitutional:       General: He is not in acute distress.     Appearance: Normal appearance.   Neurological:      Mental Status: He is alert.         HEMOGLOBIN " A1C  Lab Results   Component Value Date    HGBA1C 6.3 10/19/2021       GLUCOSE  Glucose   Date Value Ref Range Status   10/19/2021 122 70 - 130 mg/dL Final           Assessment / Plan      Assessment & Plan:  1. Type 2 diabetes mellitus with diabetic polyneuropathy, with long-term current use of insulin (HCC)  His A1c today is excellent at 6.3%.  He denies any trouble with hypoglycemia.  For now we will continue Humulin 70/30 65 units with breakfast and 60 units with supper as well as Jardiance 25 mg daily and Metformin 1000 mg twice a day.  I encouraged him to send in blood sugars for review and adjustment as needed.  I encouraged him to call to get his eye appointment scheduled.  His blood pressures okay today.  His weight is stable.  Patient to call as needed.  - POC Glycosylated Hemoglobin (Hb A1C)  - POC Glucose, Blood      Return in about 5 months (around 3/19/2022) for Recheck.     This note was dictated using Dragon voice recognition.    Cira Baca PA-C  10/19/2021

## 2022-02-17 RX ORDER — EMPAGLIFLOZIN 25 MG/1
TABLET, FILM COATED ORAL
Qty: 90 TABLET | Refills: 0 | Status: SHIPPED | OUTPATIENT
Start: 2022-02-17 | End: 2022-03-24 | Stop reason: SDUPTHER

## 2022-03-24 ENCOUNTER — OFFICE VISIT (OUTPATIENT)
Dept: ENDOCRINOLOGY | Facility: CLINIC | Age: 77
End: 2022-03-24

## 2022-03-24 VITALS
OXYGEN SATURATION: 98 % | DIASTOLIC BLOOD PRESSURE: 68 MMHG | HEIGHT: 69 IN | WEIGHT: 214 LBS | BODY MASS INDEX: 31.7 KG/M2 | SYSTOLIC BLOOD PRESSURE: 128 MMHG | HEART RATE: 79 BPM

## 2022-03-24 DIAGNOSIS — Z79.4 TYPE 2 DIABETES MELLITUS WITH DIABETIC POLYNEUROPATHY, WITH LONG-TERM CURRENT USE OF INSULIN: Primary | ICD-10-CM

## 2022-03-24 DIAGNOSIS — E11.42 TYPE 2 DIABETES MELLITUS WITH DIABETIC POLYNEUROPATHY, WITH LONG-TERM CURRENT USE OF INSULIN: Primary | ICD-10-CM

## 2022-03-24 LAB
EXPIRATION DATE: NORMAL
EXPIRATION DATE: NORMAL
GLUCOSE BLDC GLUCOMTR-MCNC: 81 MG/DL (ref 70–130)
HBA1C MFR BLD: 6.3 %
Lab: NORMAL
Lab: NORMAL

## 2022-03-24 PROCEDURE — 82947 ASSAY GLUCOSE BLOOD QUANT: CPT | Performed by: PHYSICIAN ASSISTANT

## 2022-03-24 PROCEDURE — 3044F HG A1C LEVEL LT 7.0%: CPT | Performed by: PHYSICIAN ASSISTANT

## 2022-03-24 PROCEDURE — 99213 OFFICE O/P EST LOW 20 MIN: CPT | Performed by: PHYSICIAN ASSISTANT

## 2022-03-24 PROCEDURE — 83036 HEMOGLOBIN GLYCOSYLATED A1C: CPT | Performed by: PHYSICIAN ASSISTANT

## 2022-03-24 RX ORDER — ISOSORBIDE MONONITRATE 60 MG/1
60 TABLET, EXTENDED RELEASE ORAL DAILY
COMMUNITY
Start: 2022-01-30

## 2022-03-24 RX ORDER — LEVOTHYROXINE SODIUM 0.07 MG/1
75 TABLET ORAL DAILY
Qty: 90 TABLET | Refills: 2 | Status: SHIPPED | OUTPATIENT
Start: 2022-03-24 | End: 2023-01-03 | Stop reason: SDUPTHER

## 2022-03-24 RX ORDER — LISINOPRIL 5 MG/1
2.5 TABLET ORAL DAILY
COMMUNITY
Start: 2022-02-18

## 2022-03-24 NOTE — PROGRESS NOTES
"     Office Note      Date: 2022  Patient Name: Remington Alvarado  MRN: [id number]  : 1945    Chief Complaint   Patient presents with   • Diabetes       History of Present Illness:   Remington Alvarado is a 76 y.o. male who presents for follow-up for type 2 diabetes.  He remains on Jardiance 25 mg daily, Metformin 1000 mg twice a day and Humulin 70/30 65 units with breakfast and 60 units with supper.  He reports he checks his blood sugar 1-3 times a day and the lowest reading has been in the 90s.  He reports overall his blood sugars have been good.  He was bending over loading the dryer and injured his left hamstring about 3 weeks ago.  He went to have this checked out and they recommended he go see an orthopedist but he was in so much pain he did not go for this appointment.  He has continued to have significant pain and bruising.  He reports he is due for his eye exam and will get this scheduled.  He continues to see the podiatrist every 3 months and also sees pain management for his diabetic neuropathy.  They are considering putting in a new spinal cord stimulator.  He reports he feels well from a thyroid standpoint and continues to take the levothyroxine 75 mcg daily.  He will be due for his fasting labs at his follow-up appointment in 3 to 4 months.      Subjective     Review of Systems:   Review of Systems   Constitutional: Negative.    Cardiovascular: Negative.    Gastrointestinal: Negative.    Endocrine: Negative.    Musculoskeletal: Positive for gait problem and myalgias.   Neurological: Positive for numbness.       The following portions of the patient's history were reviewed and updated as appropriate: allergies, current medications, past family history, past medical history, past social history, past surgical history and problem list.    Objective     Vitals:    22 0839   BP: 128/68   Pulse: 79   SpO2: 98%   Weight: 97.1 kg (214 lb)   Height: 175.3 cm (69\")   PainSc: 10-Worst pain ever "     Body mass index is 31.6 kg/m².    Physical Exam  Vitals reviewed.   Constitutional:       General: He is not in acute distress.     Appearance: Normal appearance.   Neurological:      Mental Status: He is alert.         HEMOGLOBIN A1C  Lab Results   Component Value Date    HGBA1C 6.3 03/24/2022       GLUCOSE  Glucose   Date Value Ref Range Status   03/24/2022 81 70 - 130 mg/dL Final       CMP  Lab Results   Component Value Date    GLUCOSE 124 (H) 07/26/2021    BUN 13 07/26/2021    CREATININE 1.08 07/26/2021    EGFRIFNONA 67 07/26/2021    BCR 12.0 07/26/2021    K 5.0 07/26/2021    CO2 17.8 (L) 07/26/2021    CALCIUM 10.1 07/26/2021    AST 22 07/26/2021    ALT 18 07/26/2021       LIPID PANEL  Lab Results   Component Value Date    CHOL 100 07/26/2021    TRIG 186 (H) 07/26/2021    HDL 41 07/26/2021    LDL 29 07/26/2021       URINE MICROALBUMIN/CREATININE RATIO  Microalbumin/Creatinine Ratio   Date Value Ref Range Status   07/26/2021 49.6 mg/g Final       TSH  Lab Results   Component Value Date    TSH 2.340 07/26/2021       Assessment / Plan      Assessment & Plan:  1. Type 2 diabetes mellitus with diabetic polyneuropathy, with long-term current use of insulin (HCC)  His A1c is stable and to goal at 6.3%.  For now he will continue the Jardiance 25 mg daily, Metformin 1000 mg twice a day and his Humulin 70/30 65 units with breakfast and 60 units with supper.  He will send in blood sugar readings for review and adjustment as needed.  His blood pressure is okay today.  His weight is up 12 pounds since his appointment in October.  I encouraged healthy eating habits and physical activity as tolerated.  I encouraged him to call the [facility] orthopedic clinic as often they can take walk-ins to get his leg checked out.  I suggested he check this out as soon as possible due to the pain he is in.  We will plan to do fasting labs and his foot exam at his follow-up appointment in 3 to 4 months for monitoring.  I  encouraged him to schedule his eye exam.  Patient to call as needed.  - POC Glycosylated Hemoglobin (Hb A1C)  - POC Glucose, Blood      Return in about 3 months (around 6/24/2022) for Recheck 3-4 mos fasting next appt.     This note was dictated using Dragon voice recognition.    SCOTT Tomlin-C  03/24/2022

## 2022-06-01 RX ORDER — EMPAGLIFLOZIN 25 MG/1
TABLET, FILM COATED ORAL
Qty: 90 TABLET | Refills: 0 | Status: SHIPPED | OUTPATIENT
Start: 2022-06-01 | End: 2022-09-01 | Stop reason: SDUPTHER

## 2022-09-01 ENCOUNTER — LAB (OUTPATIENT)
Dept: LAB | Facility: HOSPITAL | Age: 77
End: 2022-09-01

## 2022-09-01 ENCOUNTER — OFFICE VISIT (OUTPATIENT)
Dept: ENDOCRINOLOGY | Facility: CLINIC | Age: 77
End: 2022-09-01

## 2022-09-01 VITALS
HEART RATE: 77 BPM | BODY MASS INDEX: 29.62 KG/M2 | DIASTOLIC BLOOD PRESSURE: 70 MMHG | OXYGEN SATURATION: 98 % | HEIGHT: 69 IN | WEIGHT: 200 LBS | SYSTOLIC BLOOD PRESSURE: 122 MMHG

## 2022-09-01 DIAGNOSIS — E11.42 TYPE 2 DIABETES MELLITUS WITH DIABETIC POLYNEUROPATHY, WITH LONG-TERM CURRENT USE OF INSULIN: ICD-10-CM

## 2022-09-01 DIAGNOSIS — E78.2 MIXED HYPERLIPIDEMIA: ICD-10-CM

## 2022-09-01 DIAGNOSIS — Z79.4 TYPE 2 DIABETES MELLITUS WITH DIABETIC POLYNEUROPATHY, WITH LONG-TERM CURRENT USE OF INSULIN: ICD-10-CM

## 2022-09-01 DIAGNOSIS — E03.9 ACQUIRED HYPOTHYROIDISM: ICD-10-CM

## 2022-09-01 DIAGNOSIS — E11.42 TYPE 2 DIABETES MELLITUS WITH DIABETIC POLYNEUROPATHY, WITH LONG-TERM CURRENT USE OF INSULIN: Primary | ICD-10-CM

## 2022-09-01 DIAGNOSIS — Z79.4 TYPE 2 DIABETES MELLITUS WITH DIABETIC POLYNEUROPATHY, WITH LONG-TERM CURRENT USE OF INSULIN: Primary | ICD-10-CM

## 2022-09-01 LAB
EXPIRATION DATE: ABNORMAL
EXPIRATION DATE: NORMAL
GLUCOSE BLDC GLUCOMTR-MCNC: 132 MG/DL (ref 70–130)
HBA1C MFR BLD: 6.7 %
Lab: ABNORMAL
Lab: NORMAL

## 2022-09-01 PROCEDURE — 84439 ASSAY OF FREE THYROXINE: CPT

## 2022-09-01 PROCEDURE — 82570 ASSAY OF URINE CREATININE: CPT

## 2022-09-01 PROCEDURE — 80061 LIPID PANEL: CPT

## 2022-09-01 PROCEDURE — 80053 COMPREHEN METABOLIC PANEL: CPT

## 2022-09-01 PROCEDURE — 84443 ASSAY THYROID STIM HORMONE: CPT

## 2022-09-01 PROCEDURE — 82947 ASSAY GLUCOSE BLOOD QUANT: CPT | Performed by: PHYSICIAN ASSISTANT

## 2022-09-01 PROCEDURE — 99214 OFFICE O/P EST MOD 30 MIN: CPT | Performed by: PHYSICIAN ASSISTANT

## 2022-09-01 PROCEDURE — 82043 UR ALBUMIN QUANTITATIVE: CPT

## 2022-09-01 PROCEDURE — 83036 HEMOGLOBIN GLYCOSYLATED A1C: CPT | Performed by: PHYSICIAN ASSISTANT

## 2022-09-01 PROCEDURE — 3044F HG A1C LEVEL LT 7.0%: CPT | Performed by: PHYSICIAN ASSISTANT

## 2022-09-01 NOTE — PROGRESS NOTES
Office Note      Date: 2022  Patient Name: Remington Alvarado  MRN: [id number]  : 1945    Chief Complaint   Patient presents with   • Diabetes     Type II       History of Present Illness:   Remington Alvarado is a 76 y.o. male who presents for follow-up for type 2 diabetes.  He remains on Jardiance 25 mg daily, metformin 1000 mg twice a day and Humulin 70/30 up to 68 units twice a day.  He reports about 2 or 3 months ago he was having some trouble with hypoglycemia in the 70s.  He reduced his insulin and this resolved.  He denies severe or frequent hypoglycemia recently.  He typically checks his blood sugar 1-3 times daily.  He reports he recently went to get his Jardiance and this was good to be over $400 he is in the donut hole.  He reports he is due for his eye exam and will get this scheduled.  He sees the podiatrist regularly and has an appointment tomorrow.  He is fasting today to have labs checked.  He remains on his levothyroxine 75 mcg daily.  He reports he feels well from a thyroid standpoint.  He remains on atorvastatin 80 mg and Zetia 10 mg daily.  He reports he has an appointment with his cardiologist next month.  He reports he saw orthopedics about his left hamstring.  This was not torn and he was sent to physical therapy which helped though he reports it took a while to heal.      Subjective     Review of Systems:   Review of Systems   Constitutional: Negative.    Cardiovascular: Negative.    Gastrointestinal: Negative.    Endocrine: Negative.    Neurological: Negative.        The following portions of the patient's history were reviewed and updated as appropriate: allergies, current medications, past family history, past medical history, past social history, past surgical history and problem list.    Objective     Vitals:    22 0831   BP: 122/70   BP Location: Left arm   Patient Position: Sitting   Cuff Size: Adult   Pulse: 77   SpO2: 98%   Weight: 90.7 kg (200 lb)   Height:  "175.3 cm (69\")   PainSc: 0-No pain     Body mass index is 29.53 kg/m².    Physical Exam  Vitals reviewed.   Constitutional:       General: He is not in acute distress.     Appearance: Normal appearance.   Neurological:      Mental Status: He is alert.         HEMOGLOBIN A1C  Lab Results   Component Value Date    HGBA1C 6.7 09/01/2022       GLUCOSE  Glucose   Date Value Ref Range Status   09/01/2022 132 (A) 70 - 130 mg/dL Final     Comment:     Patient is fasting       CMP  Lab Results   Component Value Date    GLUCOSE 124 (H) 07/26/2021    BUN 13 07/26/2021    CREATININE 1.08 07/26/2021    EGFRIFNONA 67 07/26/2021    BCR 12.0 07/26/2021    K 5.0 07/26/2021    CO2 17.8 (L) 07/26/2021    CALCIUM 10.1 07/26/2021    AST 22 07/26/2021    ALT 18 07/26/2021       LIPID PANEL  Lab Results   Component Value Date    CHOL 100 07/26/2021    TRIG 186 (H) 07/26/2021    HDL 41 07/26/2021    LDL 29 07/26/2021       URINE MICROALBUMIN/CREATININE RATIO  Microalbumin/Creatinine Ratio   Date Value Ref Range Status   07/26/2021 49.6 mg/g Final       TSH  Lab Results   Component Value Date    TSH 2.340 07/26/2021       Assessment / Plan      Assessment & Plan:  1. Type 2 diabetes mellitus with diabetic polyneuropathy, with long-term current use of insulin (HCC)  His hemoglobin A1c today is 6.7%.  This is excellent.  For now he will continue Jardiance 25 mg daily, metformin 1000 mg twice a day and Humulin 70/30 up to 68 units with breakfast and supper.  I gave him some samples of Jardiance to help get him through the donut hole.  I refilled his Jardiance and metformin today.  His weight is down 14 pounds since his appointment in March.  I encouraged continued healthy eating habits and physical activity as tolerated.  Fasting labs pending today.  Will send note with results and plan.  - POC Glucose, Blood  - POC Glycosylated Hemoglobin (Hb A1C)  - Microalbumin / Creatinine Urine Ratio - Urine, Clean Catch; Future  - TSH; Future  - T4, " Free; Future  - Comprehensive Metabolic Panel; Future  - Lipid Panel; Future    2. Acquired hypothyroidism  TFTs pending.  Will send note with results and plan.  For now he will continue levothyroxine 75 mcg daily.  I will refill this prescription once his labs have been reviewed.  - TSH; Future  - T4, Free; Future    3. Mixed hyperlipidemia  Fasting lipid panel pending today.  Will send note with results and plan.  For now he will continue atorvastatin 80 mg and Zetia 10 mg daily.  - Lipid Panel; Future      Return in about 3 months (around 12/1/2022) for Recheck 3-4 mos.     This note was dictated using Dragon voice recognition.    Cira Baca PA-C  09/01/2022

## 2022-09-02 LAB
ALBUMIN SERPL-MCNC: 4 G/DL (ref 3.5–5.2)
ALBUMIN UR-MCNC: <1.2 MG/DL
ALBUMIN/GLOB SERPL: 1.5 G/DL
ALP SERPL-CCNC: 54 U/L (ref 39–117)
ALT SERPL W P-5'-P-CCNC: 14 U/L (ref 1–41)
ANION GAP SERPL CALCULATED.3IONS-SCNC: 12.7 MMOL/L (ref 5–15)
AST SERPL-CCNC: 16 U/L (ref 1–40)
BILIRUB SERPL-MCNC: 0.6 MG/DL (ref 0–1.2)
BUN SERPL-MCNC: 14 MG/DL (ref 8–23)
BUN/CREAT SERPL: 13.6 (ref 7–25)
CALCIUM SPEC-SCNC: 9.6 MG/DL (ref 8.6–10.5)
CHLORIDE SERPL-SCNC: 103 MMOL/L (ref 98–107)
CHOLEST SERPL-MCNC: 99 MG/DL (ref 0–200)
CO2 SERPL-SCNC: 23.3 MMOL/L (ref 22–29)
CREAT SERPL-MCNC: 1.03 MG/DL (ref 0.76–1.27)
CREAT UR-MCNC: 65.6 MG/DL
EGFRCR SERPLBLD CKD-EPI 2021: 76.2 ML/MIN/1.73
GLOBULIN UR ELPH-MCNC: 2.7 GM/DL
GLUCOSE SERPL-MCNC: 124 MG/DL (ref 65–99)
HDLC SERPL-MCNC: 32 MG/DL (ref 40–60)
LDLC SERPL CALC-MCNC: 34 MG/DL (ref 0–100)
LDLC/HDLC SERPL: 0.78 {RATIO}
MICROALBUMIN/CREAT UR: NORMAL MG/G{CREAT}
POTASSIUM SERPL-SCNC: 4.3 MMOL/L (ref 3.5–5.2)
PROT SERPL-MCNC: 6.7 G/DL (ref 6–8.5)
SODIUM SERPL-SCNC: 139 MMOL/L (ref 136–145)
T4 FREE SERPL-MCNC: 1.05 NG/DL (ref 0.93–1.7)
TRIGL SERPL-MCNC: 211 MG/DL (ref 0–150)
TSH SERPL DL<=0.05 MIU/L-ACNC: 2.76 UIU/ML (ref 0.27–4.2)
VLDLC SERPL-MCNC: 33 MG/DL (ref 5–40)

## 2023-01-03 ENCOUNTER — OFFICE VISIT (OUTPATIENT)
Dept: ENDOCRINOLOGY | Facility: CLINIC | Age: 78
End: 2023-01-03
Payer: MEDICARE

## 2023-01-03 VITALS
SYSTOLIC BLOOD PRESSURE: 120 MMHG | BODY MASS INDEX: 29.62 KG/M2 | OXYGEN SATURATION: 95 % | DIASTOLIC BLOOD PRESSURE: 71 MMHG | HEIGHT: 69 IN | HEART RATE: 84 BPM | WEIGHT: 200 LBS

## 2023-01-03 DIAGNOSIS — E03.9 ACQUIRED HYPOTHYROIDISM: ICD-10-CM

## 2023-01-03 DIAGNOSIS — E11.42 TYPE 2 DIABETES MELLITUS WITH DIABETIC POLYNEUROPATHY, WITH LONG-TERM CURRENT USE OF INSULIN: Primary | ICD-10-CM

## 2023-01-03 DIAGNOSIS — Z79.4 TYPE 2 DIABETES MELLITUS WITH DIABETIC POLYNEUROPATHY, WITH LONG-TERM CURRENT USE OF INSULIN: Primary | ICD-10-CM

## 2023-01-03 LAB
EXPIRATION DATE: ABNORMAL
EXPIRATION DATE: NORMAL
GLUCOSE BLDC GLUCOMTR-MCNC: 134 MG/DL (ref 70–130)
HBA1C MFR BLD: 6.6 %
Lab: ABNORMAL
Lab: NORMAL

## 2023-01-03 PROCEDURE — 83036 HEMOGLOBIN GLYCOSYLATED A1C: CPT | Performed by: PHYSICIAN ASSISTANT

## 2023-01-03 PROCEDURE — 1159F MED LIST DOCD IN RCRD: CPT | Performed by: PHYSICIAN ASSISTANT

## 2023-01-03 PROCEDURE — 82947 ASSAY GLUCOSE BLOOD QUANT: CPT | Performed by: PHYSICIAN ASSISTANT

## 2023-01-03 PROCEDURE — 99214 OFFICE O/P EST MOD 30 MIN: CPT | Performed by: PHYSICIAN ASSISTANT

## 2023-01-03 PROCEDURE — 3044F HG A1C LEVEL LT 7.0%: CPT | Performed by: PHYSICIAN ASSISTANT

## 2023-01-03 PROCEDURE — 1160F RVW MEDS BY RX/DR IN RCRD: CPT | Performed by: PHYSICIAN ASSISTANT

## 2023-01-03 RX ORDER — LEVOTHYROXINE SODIUM 0.07 MG/1
75 TABLET ORAL DAILY
Qty: 90 TABLET | Refills: 2 | Status: SHIPPED | OUTPATIENT
Start: 2023-01-03

## 2023-01-03 NOTE — PROGRESS NOTES
Office Note      Date: 2023  Patient Name: Remington Alvarado  MRN: [id number]  : 1945    Chief Complaint   Patient presents with   • Diabetes       History of Present Illness:   Remington Alvarado is a 77 y.o. male who presents for follow-up for type 2 diabetes.  He reports overall his blood sugars have been good.  He remains on Jardiance 25 mg daily, metformin 1000 mg twice a day and Humulin 70/30 up to 68 units twice a day though he typically takes around 60 units twice a day.  He denies any severe or frequent hypoglycemia recently.  He reports he did reduce his insulin due to low blood sugars and has not had any trouble since.  He he currently gets his insulin over-the-counter through [employer] [facility] due to cost.  He reports he is due for his eye exam and will get this scheduled.  He denies any trouble with his feet today.  He sees the podiatrist regularly.  We did fasting labs last visit and these were okay.  He reports he has been having some trouble with dizzy spells and they feel this was the Lyrica they added for pain.  He reports he has also had some vomiting on occasion and his doctor is doing some blood work.  He remains on levothyroxine 75 mcg daily.  His TSH was normal last visit.      Subjective     Review of Systems:   Review of Systems   Constitutional: Negative.    Cardiovascular: Negative.    Gastrointestinal: Positive for vomiting.   Endocrine: Negative.    Neurological: Positive for dizziness.       The following portions of the patient's history were reviewed and updated as appropriate: allergies, current medications, past family history, past medical history, past social history, past surgical history and problem list.    Objective     Vitals:    23 0839   BP: 120/71   Pulse: 84   SpO2: 95%   Weight: 90.7 kg (200 lb)   Height: 175.3 cm (69\")     Body mass index is 29.53 kg/m².    Physical Exam  Vitals reviewed.   Constitutional:       General: He is not in acute distress.      Appearance: Normal appearance.   Neurological:      Mental Status: He is alert.         HEMOGLOBIN A1C  Lab Results   Component Value Date    HGBA1C 6.6 01/03/2023       GLUCOSE  Glucose   Date Value Ref Range Status   01/03/2023 134 (A) 70 - 130 mg/dL Final       CMP  Lab Results   Component Value Date    GLUCOSE 124 (H) 09/01/2022    BUN 14 09/01/2022    CREATININE 1.03 09/01/2022    EGFRIFNONA 67 07/26/2021    BCR 13.6 09/01/2022    K 4.3 09/01/2022    CO2 23.3 09/01/2022    CALCIUM 9.6 09/01/2022    AST 16 09/01/2022    ALT 14 09/01/2022       LIPID PANEL  Lab Results   Component Value Date    CHOL 99 09/01/2022    TRIG 211 (H) 09/01/2022    HDL 32 (L) 09/01/2022    LDL 34 09/01/2022       URINE MICROALBUMIN/CREATININE RATIO  Microalbumin/Creatinine Ratio   Date Value Ref Range Status   09/01/2022   Final     Comment:     Unable to calculate       TSH  Lab Results   Component Value Date    TSH 2.760 09/01/2022       Assessment / Plan      Assessment & Plan:  1. Type 2 diabetes mellitus with diabetic polyneuropathy, with long-term current use of insulin (HCC)  His hemoglobin A1c today is excellent at 6.6%.  His blood glucose this morning is 134 mg/dL.  For now we will continue Jardiance 25 mg daily, metformin 1000 mg twice a day and Humulin 70/30 up to 68 units twice a day.  I gave him a written prescription to take into [facility] to see if this is covered by his insurance for the $35 per month.  We discussed continuing to get it through [facility] if it is not more affordable.  His blood pressure is okay today.  His weight is stable.  He had lost 14 pounds at his appointment in September.  He will continue healthy eating habits and physical activity as tolerated.    - POC Glucose, Blood  - POC Glycosylated Hemoglobin (Hb A1C)    2. Acquired hypothyroidism  His TSH was normal at his appointment in September.  He will continue levothyroxine 75 mcg daily.  I refilled this prescription today.      Return in about 4  months (around 5/3/2023) for Recheck.     This note was dictated using Dragon voice recognition.    Cira Baca PA-C  01/03/2023

## 2023-04-13 ENCOUNTER — OFFICE VISIT (OUTPATIENT)
Dept: ENDOCRINOLOGY | Facility: CLINIC | Age: 78
End: 2023-04-13
Payer: MEDICARE

## 2023-04-13 VITALS
OXYGEN SATURATION: 98 % | SYSTOLIC BLOOD PRESSURE: 110 MMHG | HEART RATE: 67 BPM | BODY MASS INDEX: 29.92 KG/M2 | WEIGHT: 202 LBS | HEIGHT: 69 IN | DIASTOLIC BLOOD PRESSURE: 68 MMHG

## 2023-04-13 DIAGNOSIS — E03.9 ACQUIRED HYPOTHYROIDISM: ICD-10-CM

## 2023-04-13 DIAGNOSIS — Z79.4 TYPE 2 DIABETES MELLITUS WITH DIABETIC POLYNEUROPATHY, WITH LONG-TERM CURRENT USE OF INSULIN: Primary | ICD-10-CM

## 2023-04-13 DIAGNOSIS — E11.42 TYPE 2 DIABETES MELLITUS WITH DIABETIC POLYNEUROPATHY, WITH LONG-TERM CURRENT USE OF INSULIN: Primary | ICD-10-CM

## 2023-04-13 LAB
EXPIRATION DATE: NORMAL
EXPIRATION DATE: NORMAL
GLUCOSE BLDC GLUCOMTR-MCNC: 91 MG/DL (ref 70–130)
HBA1C MFR BLD: 6.5 %
Lab: NORMAL
Lab: NORMAL

## 2023-04-13 PROCEDURE — 1160F RVW MEDS BY RX/DR IN RCRD: CPT | Performed by: PHYSICIAN ASSISTANT

## 2023-04-13 PROCEDURE — 82947 ASSAY GLUCOSE BLOOD QUANT: CPT | Performed by: PHYSICIAN ASSISTANT

## 2023-04-13 PROCEDURE — 3044F HG A1C LEVEL LT 7.0%: CPT | Performed by: PHYSICIAN ASSISTANT

## 2023-04-13 PROCEDURE — 1159F MED LIST DOCD IN RCRD: CPT | Performed by: PHYSICIAN ASSISTANT

## 2023-04-13 PROCEDURE — 83036 HEMOGLOBIN GLYCOSYLATED A1C: CPT | Performed by: PHYSICIAN ASSISTANT

## 2023-04-13 PROCEDURE — 99214 OFFICE O/P EST MOD 30 MIN: CPT | Performed by: PHYSICIAN ASSISTANT

## 2023-04-13 NOTE — PROGRESS NOTES
Office Note      Date: 2023  Patient Name: Remington Alvarado  MRN: [id number]  : 1945    Chief Complaint   Patient presents with   • Diabetes     Type II       History of Present Illness:   Remington Alvarado is a 77 y.o. male who presents for follow-up for type 2 diabetes.  He remains on Jardiance 25 mg daily and metformin 1000 mg twice a day.  He reports he had to pay $600 for his last 90-day supply of Jardiance but he feels this was related to his deductible.  He continues to get his insulin at [facility] as this is the most affordable for him.  He remains on Humulin 70/30 up to 68 units with breakfast and supper.  He reports he has not been taking his insulin every morning.  He reports his blood sugar is in the 90s he often waits and monitors this more closely.  He reports that he is not eating much she does not want to get a low blood glucose.  He reports he always takes his insulin with supper.  He checks his blood sugar 1-3 times daily and overall this is good.  He is overdue for his eye exam and needs to call to get this scheduled.  He denies any trouble with his feet today.  He continues to see the podiatrist regularly.  We did fasting labs at his appointment in September.   He remains on levothyroxine 75 mcg daily.  He reports overall he feels well from a thyroid standpoint.  He reports he does have some trouble with constipation but this is nothing new and he attributes this to his pain medication.      Subjective     Review of Systems:   Review of Systems   Constitutional: Negative.    Gastrointestinal: Positive for constipation.   Endocrine: Negative.    Neurological: Negative.        The following portions of the patient's history were reviewed and updated as appropriate: allergies, current medications, past family history, past medical history, past social history, past surgical history and problem list.    Objective     Vitals:    23 0829   BP: 110/68   BP Location: Left arm  "  Patient Position: Sitting   Cuff Size: Adult   Pulse: 67   SpO2: 98%   Weight: 91.6 kg (202 lb)   Height: 175.3 cm (69\")   PainSc: 0-No pain     Body mass index is 29.83 kg/m².    Physical Exam  Vitals reviewed.   Constitutional:       General: He is not in acute distress.     Appearance: Normal appearance.   Neurological:      Mental Status: He is alert.         HEMOGLOBIN A1C  Lab Results   Component Value Date    HGBA1C 6.5 04/13/2023       GLUCOSE  Glucose   Date Value Ref Range Status   04/13/2023 91 70 - 130 mg/dL Final     Comment:     Fasting       CMP  Lab Results   Component Value Date    GLUCOSE 124 (H) 09/01/2022    BUN 14 09/01/2022    CREATININE 1.03 09/01/2022    EGFRIFNONA 67 07/26/2021    BCR 13.6 09/01/2022    K 4.3 09/01/2022    CO2 23.3 09/01/2022    CALCIUM 9.6 09/01/2022    AST 16 09/01/2022    ALT 14 09/01/2022       LIPID PANEL  Lab Results   Component Value Date    CHOL 99 09/01/2022    TRIG 211 (H) 09/01/2022    HDL 32 (L) 09/01/2022    LDL 34 09/01/2022       URINE MICROALBUMIN/CREATININE RATIO  Microalbumin/Creatinine Ratio   Date Value Ref Range Status   09/01/2022   Final     Comment:     Unable to calculate       TSH  Lab Results   Component Value Date    TSH 2.760 09/01/2022       Assessment / Plan      Assessment & Plan:  1. Type 2 diabetes mellitus with diabetic polyneuropathy, with long-term current use of insulin  His hemoglobin A1c today is excellent at 6.5%.  For now we will continue his current medications.  I gave him some samples of Jardiance to help cushion him due to cost.  I encouraged him to reach out to me if this continues to be expensive once his deductible is met and we can look at other options.  His blood pressure is okay today.  His weight is up 2 pounds since his appointment in January.  I encouraged continued healthy eating habits and physical activity as tolerated.  I encouraged him to get his eye exam scheduled.  - POC Glucose, Blood  - POC Glycosylated " Hemoglobin (Hb A1C)    2. Acquired hypothyroidism  He reports he feels well from a thyroid standpoint.  For now he will continue the levothyroxine 75 mcg daily.      Return in about 6 months (around 10/13/2023) for Recheck 5-6 mos fasting.     This note was dictated using Dragon voice recognition.    Cira Baca PA-C  04/13/2023

## 2023-05-08 ENCOUNTER — APPOINTMENT (OUTPATIENT)
Dept: CT IMAGING | Facility: HOSPITAL | Age: 78
End: 2023-05-08
Payer: MEDICARE

## 2023-05-08 ENCOUNTER — APPOINTMENT (OUTPATIENT)
Dept: GENERAL RADIOLOGY | Facility: HOSPITAL | Age: 78
End: 2023-05-08
Payer: MEDICARE

## 2023-05-08 ENCOUNTER — HOSPITAL ENCOUNTER (INPATIENT)
Facility: HOSPITAL | Age: 78
LOS: 1 days | Discharge: HOME | End: 2023-05-11
Attending: EMERGENCY MEDICINE | Admitting: INTERNAL MEDICINE
Payer: MEDICARE

## 2023-05-08 DIAGNOSIS — I20.0 UNSTABLE ANGINA: Primary | ICD-10-CM

## 2023-05-08 DIAGNOSIS — Z86.79 HISTORY OF CORONARY ARTERY DISEASE: ICD-10-CM

## 2023-05-08 PROBLEM — K21.9 GERD WITHOUT ESOPHAGITIS: Status: ACTIVE | Noted: 2023-05-08

## 2023-05-08 PROBLEM — G62.9 PERIPHERAL NEUROPATHY: Status: ACTIVE | Noted: 2023-05-08

## 2023-05-08 PROBLEM — R06.02 SHORTNESS OF BREATH: Status: ACTIVE | Noted: 2023-05-08

## 2023-05-08 LAB
ALBUMIN SERPL-MCNC: 4.4 G/DL (ref 3.5–5.2)
ALBUMIN/GLOB SERPL: 1.5 G/DL
ALP SERPL-CCNC: 56 U/L (ref 39–117)
ALT SERPL W P-5'-P-CCNC: 12 U/L (ref 1–41)
ANION GAP SERPL CALCULATED.3IONS-SCNC: 16 MMOL/L (ref 5–15)
AST SERPL-CCNC: 17 U/L (ref 1–40)
BACTERIA UR QL AUTO: ABNORMAL /HPF
BASOPHILS # BLD AUTO: 0.02 10*3/MM3 (ref 0–0.2)
BASOPHILS NFR BLD AUTO: 0.4 % (ref 0–1.5)
BILIRUB SERPL-MCNC: 0.9 MG/DL (ref 0–1.2)
BILIRUB UR QL STRIP: NEGATIVE
BUN SERPL-MCNC: 16 MG/DL (ref 8–23)
BUN/CREAT SERPL: 16.2 (ref 7–25)
CALCIUM SPEC-SCNC: 9.4 MG/DL (ref 8.6–10.5)
CHLORIDE SERPL-SCNC: 102 MMOL/L (ref 98–107)
CLARITY UR: CLEAR
CO2 SERPL-SCNC: 19 MMOL/L (ref 22–29)
COLOR UR: YELLOW
CREAT SERPL-MCNC: 0.99 MG/DL (ref 0.76–1.27)
DEPRECATED RDW RBC AUTO: 48.3 FL (ref 37–54)
EGFRCR SERPLBLD CKD-EPI 2021: 79.4 ML/MIN/1.73
EOSINOPHIL # BLD AUTO: 0.08 10*3/MM3 (ref 0–0.4)
EOSINOPHIL NFR BLD AUTO: 1.8 % (ref 0.3–6.2)
ERYTHROCYTE [DISTWIDTH] IN BLOOD BY AUTOMATED COUNT: 14.6 % (ref 12.3–15.4)
GEN 5 2HR TROPONIN T REFLEX: 32 NG/L
GLOBULIN UR ELPH-MCNC: 2.9 GM/DL
GLUCOSE BLDC GLUCOMTR-MCNC: 140 MG/DL (ref 70–130)
GLUCOSE SERPL-MCNC: 133 MG/DL (ref 65–99)
GLUCOSE UR STRIP-MCNC: ABNORMAL MG/DL
HCT VFR BLD AUTO: 42.3 % (ref 37.5–51)
HGB BLD-MCNC: 13 G/DL (ref 13–17.7)
HGB UR QL STRIP.AUTO: ABNORMAL
HOLD SPECIMEN: NORMAL
HYALINE CASTS UR QL AUTO: ABNORMAL /LPF
IMM GRANULOCYTES # BLD AUTO: 0.01 10*3/MM3 (ref 0–0.05)
IMM GRANULOCYTES NFR BLD AUTO: 0.2 % (ref 0–0.5)
KETONES UR QL STRIP: NEGATIVE
LEUKOCYTE ESTERASE UR QL STRIP.AUTO: NEGATIVE
LIPASE SERPL-CCNC: 19 U/L (ref 13–60)
LYMPHOCYTES # BLD AUTO: 0.73 10*3/MM3 (ref 0.7–3.1)
LYMPHOCYTES NFR BLD AUTO: 16.4 % (ref 19.6–45.3)
MCH RBC QN AUTO: 27.9 PG (ref 26.6–33)
MCHC RBC AUTO-ENTMCNC: 30.7 G/DL (ref 31.5–35.7)
MCV RBC AUTO: 90.8 FL (ref 79–97)
MONOCYTES # BLD AUTO: 0.55 10*3/MM3 (ref 0.1–0.9)
MONOCYTES NFR BLD AUTO: 12.4 % (ref 5–12)
NEUTROPHILS NFR BLD AUTO: 3.06 10*3/MM3 (ref 1.7–7)
NEUTROPHILS NFR BLD AUTO: 68.8 % (ref 42.7–76)
NITRITE UR QL STRIP: NEGATIVE
NRBC BLD AUTO-RTO: 0 /100 WBC (ref 0–0.2)
NT-PROBNP SERPL-MCNC: 941.9 PG/ML (ref 0–1800)
PH UR STRIP.AUTO: <=5 [PH] (ref 5–8)
PLATELET # BLD AUTO: 186 10*3/MM3 (ref 140–450)
PMV BLD AUTO: 9.4 FL (ref 6–12)
POTASSIUM SERPL-SCNC: 5 MMOL/L (ref 3.5–5.2)
PROT SERPL-MCNC: 7.3 G/DL (ref 6–8.5)
PROT UR QL STRIP: NEGATIVE
QT INTERVAL: 388 MS
QTC INTERVAL: 436 MS
RBC # BLD AUTO: 4.66 10*6/MM3 (ref 4.14–5.8)
RBC # UR STRIP: ABNORMAL /HPF
REF LAB TEST METHOD: ABNORMAL
SODIUM SERPL-SCNC: 137 MMOL/L (ref 136–145)
SP GR UR STRIP: 1.01 (ref 1–1.03)
SQUAMOUS #/AREA URNS HPF: ABNORMAL /HPF
TROPONIN T DELTA: 6 NG/L
TROPONIN T SERPL HS-MCNC: 26 NG/L
TROPONIN T SERPL HS-MCNC: 34 NG/L
UROBILINOGEN UR QL STRIP: ABNORMAL
WBC # UR STRIP: ABNORMAL /HPF
WBC NRBC COR # BLD: 4.45 10*3/MM3 (ref 3.4–10.8)
WHOLE BLOOD HOLD COAG: NORMAL
WHOLE BLOOD HOLD SPECIMEN: NORMAL

## 2023-05-08 PROCEDURE — G0378 HOSPITAL OBSERVATION PER HR: HCPCS

## 2023-05-08 PROCEDURE — 99222 1ST HOSP IP/OBS MODERATE 55: CPT | Performed by: NURSE PRACTITIONER

## 2023-05-08 PROCEDURE — 71275 CT ANGIOGRAPHY CHEST: CPT

## 2023-05-08 PROCEDURE — 36415 COLL VENOUS BLD VENIPUNCTURE: CPT

## 2023-05-08 PROCEDURE — 83690 ASSAY OF LIPASE: CPT | Performed by: EMERGENCY MEDICINE

## 2023-05-08 PROCEDURE — 80053 COMPREHEN METABOLIC PANEL: CPT | Performed by: EMERGENCY MEDICINE

## 2023-05-08 PROCEDURE — 93005 ELECTROCARDIOGRAM TRACING: CPT

## 2023-05-08 PROCEDURE — 84484 ASSAY OF TROPONIN QUANT: CPT | Performed by: EMERGENCY MEDICINE

## 2023-05-08 PROCEDURE — 93005 ELECTROCARDIOGRAM TRACING: CPT | Performed by: EMERGENCY MEDICINE

## 2023-05-08 PROCEDURE — 25510000001 IOPAMIDOL PER 1 ML: Performed by: EMERGENCY MEDICINE

## 2023-05-08 PROCEDURE — 84484 ASSAY OF TROPONIN QUANT: CPT | Performed by: NURSE PRACTITIONER

## 2023-05-08 PROCEDURE — 81001 URINALYSIS AUTO W/SCOPE: CPT | Performed by: NURSE PRACTITIONER

## 2023-05-08 PROCEDURE — 63710000001 ONDANSETRON ODT 4 MG TABLET DISPERSIBLE: Performed by: PHYSICIAN ASSISTANT

## 2023-05-08 PROCEDURE — 85025 COMPLETE CBC W/AUTO DIFF WBC: CPT | Performed by: EMERGENCY MEDICINE

## 2023-05-08 PROCEDURE — 99285 EMERGENCY DEPT VISIT HI MDM: CPT

## 2023-05-08 PROCEDURE — 83880 ASSAY OF NATRIURETIC PEPTIDE: CPT | Performed by: EMERGENCY MEDICINE

## 2023-05-08 PROCEDURE — 25010000002 LORAZEPAM PER 2 MG: Performed by: PEDIATRICS

## 2023-05-08 PROCEDURE — 25010000002 ENOXAPARIN PER 10 MG: Performed by: NURSE PRACTITIONER

## 2023-05-08 PROCEDURE — 82948 REAGENT STRIP/BLOOD GLUCOSE: CPT

## 2023-05-08 PROCEDURE — 25010000002 FUROSEMIDE PER 20 MG: Performed by: NURSE PRACTITIONER

## 2023-05-08 RX ORDER — LISINOPRIL 5 MG/1
2.5 TABLET ORAL DAILY
Status: DISCONTINUED | OUTPATIENT
Start: 2023-05-09 | End: 2023-05-11

## 2023-05-08 RX ORDER — LEVOTHYROXINE SODIUM 0.07 MG/1
75 TABLET ORAL DAILY
Status: DISCONTINUED | OUTPATIENT
Start: 2023-05-09 | End: 2023-05-08

## 2023-05-08 RX ORDER — SODIUM CHLORIDE 0.9 % (FLUSH) 0.9 %
10 SYRINGE (ML) INJECTION AS NEEDED
Status: DISCONTINUED | OUTPATIENT
Start: 2023-05-08 | End: 2023-05-11 | Stop reason: HOSPADM

## 2023-05-08 RX ORDER — ACETAMINOPHEN 650 MG/1
650 SUPPOSITORY RECTAL EVERY 4 HOURS PRN
Status: DISCONTINUED | OUTPATIENT
Start: 2023-05-08 | End: 2023-05-11 | Stop reason: HOSPADM

## 2023-05-08 RX ORDER — GABAPENTIN 400 MG/1
800 CAPSULE ORAL ONCE
Status: COMPLETED | OUTPATIENT
Start: 2023-05-08 | End: 2023-05-08

## 2023-05-08 RX ORDER — CHOLECALCIFEROL (VITAMIN D3) 125 MCG
5 CAPSULE ORAL NIGHTLY PRN
Status: DISCONTINUED | OUTPATIENT
Start: 2023-05-08 | End: 2023-05-11 | Stop reason: HOSPADM

## 2023-05-08 RX ORDER — IBUPROFEN 600 MG/1
1 TABLET ORAL
Status: DISCONTINUED | OUTPATIENT
Start: 2023-05-08 | End: 2023-05-10

## 2023-05-08 RX ORDER — PANTOPRAZOLE SODIUM 40 MG/1
40 TABLET, DELAYED RELEASE ORAL
Status: DISCONTINUED | OUTPATIENT
Start: 2023-05-09 | End: 2023-05-11 | Stop reason: HOSPADM

## 2023-05-08 RX ORDER — LORAZEPAM 2 MG/ML
0.25 INJECTION INTRAMUSCULAR ONCE
Status: COMPLETED | OUTPATIENT
Start: 2023-05-08 | End: 2023-05-08

## 2023-05-08 RX ORDER — NICOTINE POLACRILEX 4 MG
15 LOZENGE BUCCAL
Status: DISCONTINUED | OUTPATIENT
Start: 2023-05-08 | End: 2023-05-10

## 2023-05-08 RX ORDER — ASPIRIN 81 MG/1
81 TABLET ORAL DAILY
Status: DISCONTINUED | OUTPATIENT
Start: 2023-05-09 | End: 2023-05-11 | Stop reason: HOSPADM

## 2023-05-08 RX ORDER — ONDANSETRON 2 MG/ML
4 INJECTION INTRAMUSCULAR; INTRAVENOUS EVERY 6 HOURS PRN
Status: DISCONTINUED | OUTPATIENT
Start: 2023-05-08 | End: 2023-05-11 | Stop reason: HOSPADM

## 2023-05-08 RX ORDER — ENOXAPARIN SODIUM 100 MG/ML
40 INJECTION SUBCUTANEOUS DAILY
Status: DISCONTINUED | OUTPATIENT
Start: 2023-05-08 | End: 2023-05-11 | Stop reason: HOSPADM

## 2023-05-08 RX ORDER — ONDANSETRON 4 MG/1
4 TABLET, ORALLY DISINTEGRATING ORAL ONCE
Status: COMPLETED | OUTPATIENT
Start: 2023-05-08 | End: 2023-05-08

## 2023-05-08 RX ORDER — ASPIRIN 81 MG/1
324 TABLET, CHEWABLE ORAL ONCE
Status: COMPLETED | OUTPATIENT
Start: 2023-05-08 | End: 2023-05-08

## 2023-05-08 RX ORDER — ACETAMINOPHEN 160 MG/5ML
650 SOLUTION ORAL EVERY 4 HOURS PRN
Status: DISCONTINUED | OUTPATIENT
Start: 2023-05-08 | End: 2023-05-11 | Stop reason: HOSPADM

## 2023-05-08 RX ORDER — ATORVASTATIN CALCIUM 40 MG/1
80 TABLET, FILM COATED ORAL NIGHTLY
Status: DISCONTINUED | OUTPATIENT
Start: 2023-05-08 | End: 2023-05-10

## 2023-05-08 RX ORDER — CLOPIDOGREL BISULFATE 75 MG/1
75 TABLET ORAL DAILY
Status: DISCONTINUED | OUTPATIENT
Start: 2023-05-09 | End: 2023-05-11 | Stop reason: HOSPADM

## 2023-05-08 RX ORDER — NITROGLYCERIN 20 MG/100ML
5-200 INJECTION INTRAVENOUS
Status: DISCONTINUED | OUTPATIENT
Start: 2023-05-08 | End: 2023-05-10

## 2023-05-08 RX ORDER — HYDROCODONE BITARTRATE AND ACETAMINOPHEN 10; 325 MG/1; MG/1
1 TABLET ORAL EVERY 8 HOURS PRN
Status: DISCONTINUED | OUTPATIENT
Start: 2023-05-08 | End: 2023-05-11 | Stop reason: HOSPADM

## 2023-05-08 RX ORDER — SODIUM CHLORIDE 0.9 % (FLUSH) 0.9 %
10 SYRINGE (ML) INJECTION EVERY 12 HOURS SCHEDULED
Status: DISCONTINUED | OUTPATIENT
Start: 2023-05-08 | End: 2023-05-11 | Stop reason: HOSPADM

## 2023-05-08 RX ORDER — ACETAMINOPHEN 325 MG/1
650 TABLET ORAL EVERY 4 HOURS PRN
Status: DISCONTINUED | OUTPATIENT
Start: 2023-05-08 | End: 2023-05-11 | Stop reason: HOSPADM

## 2023-05-08 RX ORDER — INSULIN LISPRO 100 [IU]/ML
0-14 INJECTION, SOLUTION INTRAVENOUS; SUBCUTANEOUS
Status: DISCONTINUED | OUTPATIENT
Start: 2023-05-08 | End: 2023-05-10

## 2023-05-08 RX ORDER — DEXTROSE MONOHYDRATE 25 G/50ML
25 INJECTION, SOLUTION INTRAVENOUS
Status: DISCONTINUED | OUTPATIENT
Start: 2023-05-08 | End: 2023-05-10

## 2023-05-08 RX ORDER — FUROSEMIDE 10 MG/ML
20 INJECTION INTRAMUSCULAR; INTRAVENOUS ONCE
Status: COMPLETED | OUTPATIENT
Start: 2023-05-08 | End: 2023-05-08

## 2023-05-08 RX ORDER — SODIUM CHLORIDE 9 MG/ML
40 INJECTION, SOLUTION INTRAVENOUS AS NEEDED
Status: DISCONTINUED | OUTPATIENT
Start: 2023-05-08 | End: 2023-05-11 | Stop reason: HOSPADM

## 2023-05-08 RX ORDER — ONDANSETRON 4 MG/1
4 TABLET, FILM COATED ORAL EVERY 6 HOURS PRN
Status: DISCONTINUED | OUTPATIENT
Start: 2023-05-08 | End: 2023-05-11 | Stop reason: HOSPADM

## 2023-05-08 RX ORDER — CARVEDILOL 3.12 MG/1
3.12 TABLET ORAL 2 TIMES DAILY WITH MEALS
Status: DISCONTINUED | OUTPATIENT
Start: 2023-05-09 | End: 2023-05-11 | Stop reason: HOSPADM

## 2023-05-08 RX ORDER — HYDROCODONE BITARTRATE AND ACETAMINOPHEN 10; 325 MG/1; MG/1
1 TABLET ORAL ONCE
Status: COMPLETED | OUTPATIENT
Start: 2023-05-08 | End: 2023-05-08

## 2023-05-08 RX ORDER — SODIUM CHLORIDE 0.9 % (FLUSH) 0.9 %
10 SYRINGE (ML) INJECTION AS NEEDED
Status: DISCONTINUED | OUTPATIENT
Start: 2023-05-08 | End: 2023-05-08 | Stop reason: SDUPTHER

## 2023-05-08 RX ORDER — LEVOTHYROXINE SODIUM 0.07 MG/1
75 TABLET ORAL
Status: DISCONTINUED | OUTPATIENT
Start: 2023-05-09 | End: 2023-05-11 | Stop reason: HOSPADM

## 2023-05-08 RX ORDER — DIAPER,BRIEF,INFANT-TODD,DISP
1 EACH MISCELLANEOUS 3 TIMES DAILY PRN
Status: DISCONTINUED | OUTPATIENT
Start: 2023-05-08 | End: 2023-05-11 | Stop reason: HOSPADM

## 2023-05-08 RX ORDER — GABAPENTIN 400 MG/1
800 CAPSULE ORAL 4 TIMES DAILY
Status: DISCONTINUED | OUTPATIENT
Start: 2023-05-08 | End: 2023-05-11 | Stop reason: HOSPADM

## 2023-05-08 RX ADMIN — FUROSEMIDE 20 MG: 10 INJECTION, SOLUTION INTRAMUSCULAR; INTRAVENOUS at 20:36

## 2023-05-08 RX ADMIN — Medication 10 ML: at 20:44

## 2023-05-08 RX ADMIN — ONDANSETRON 4 MG: 4 TABLET, ORALLY DISINTEGRATING ORAL at 13:40

## 2023-05-08 RX ADMIN — IOPAMIDOL 93 ML: 755 INJECTION, SOLUTION INTRAVENOUS at 16:20

## 2023-05-08 RX ADMIN — LORAZEPAM 0.25 MG: 2 INJECTION INTRAMUSCULAR; INTRAVENOUS at 20:36

## 2023-05-08 RX ADMIN — GABAPENTIN 800 MG: 400 CAPSULE ORAL at 18:36

## 2023-05-08 RX ADMIN — ATORVASTATIN CALCIUM 80 MG: 40 TABLET, FILM COATED ORAL at 20:36

## 2023-05-08 RX ADMIN — HYDROCODONE BITARTRATE AND ACETAMINOPHEN 1 TABLET: 10; 325 TABLET ORAL at 18:36

## 2023-05-08 RX ADMIN — ASPIRIN 81 MG 324 MG: 81 TABLET ORAL at 16:51

## 2023-05-08 RX ADMIN — NITROGLYCERIN 5 MCG/MIN: 20 INJECTION INTRAVENOUS at 18:16

## 2023-05-08 RX ADMIN — ENOXAPARIN SODIUM 40 MG: 40 INJECTION SUBCUTANEOUS at 20:36

## 2023-05-08 NOTE — Clinical Note
Prepped: right groin and Left Wrist. Prepped with: ChloraPrep. The site was clipped. The patient was draped in a sterile fashion.

## 2023-05-08 NOTE — H&P
"    [facility] Medicine Services  HISTORY AND PHYSICAL    Patient Name: Remington Alvarado  : 1945  MRN: [id number]  Primary Care Physician: Disha Velez DO  Date of admission: 2023    Subjective   Subjective     Chief Complaint:  Shortness of breath      HPI:  Remington Alvarado is a 77 y.o. male w/ a hx of CAD (s/p CABG x2, multiple stents), HTN, HLD, T2DM, peripheral neuropathy, GERD, hypothyroidism who presented to the ED w/ c/o shortness of breath.   Pt reports that he developed shortness of breath 2-3 days ago. It became worse yesterday and significantly worse today. Pt called his Cardiologist office and was instructed to come to the ED for further evaluation. Pt describes feeling short of breath even at rest, describes it as \"not being able to get enough air\". Yesterday pt also developed what he describes as \"chest discomfort\". Located across his entire chest w/ no radiation. Pt describes that his chest discomfort is \"directly associated w/ his breathing difficulty\". Pt reports that his chest discomfort is different than the chest pain he usually experiences w/ previous Mis. He describes that he usually has severe, heavy pressure and pain w/ past episodes. Additionally, pt notes pedal edema and edema of his hands over the last several days.   Pt denies fever/chills, cough, N/V/D, abdominal pain, dysuria, syncope, confusion.   Pt evaluated in the ED. CTA Chest unremarkable. Troponin 26, 32. EKG stable. Pt admitted to the hospital medicine service for further evaluation.     Review of Systems   Constitutional: Positive for fatigue. Negative for chills, diaphoresis, fever and unexpected weight change.   HENT: Negative.  Negative for congestion, postnasal drip, rhinorrhea, sinus pressure, sinus pain, sneezing, sore throat and trouble swallowing.    Eyes: Negative.  Negative for visual disturbance.   Respiratory: Positive for chest tightness and shortness of breath. " Negative for cough and wheezing.    Cardiovascular: Positive for chest pain. Negative for palpitations and leg swelling.        Hand and feet edema.    Gastrointestinal: Negative.  Negative for abdominal distention, abdominal pain, blood in stool, constipation, diarrhea, nausea and vomiting.   Endocrine: Negative.    Genitourinary: Negative.  Negative for decreased urine volume, difficulty urinating, dysuria, flank pain, frequency, hematuria and urgency.   Musculoskeletal: Negative.  Negative for arthralgias, back pain, myalgias, neck pain and neck stiffness.   Skin: Negative.  Negative for wound.   Allergic/Immunologic: Negative.  Negative for immunocompromised state.   Neurological: Negative.  Negative for dizziness, tremors, seizures, syncope, facial asymmetry, speech difficulty, weakness, light-headedness, numbness and headaches.   Hematological: Negative.  Does not bruise/bleed easily.   Psychiatric/Behavioral: Negative.  Negative for confusion.   All other systems reviewed and are negative.     Personal History     Past Medical History:   Diagnosis Date   • Arrhythmia    • Coronary artery disease    • Disease of thyroid gland    • GERD (gastroesophageal reflux disease)    • Heart attack    • Heart disease    • Hyperlipidemia    • Hypertension    • Hypothyroidism    • Neuropathy due to type 2 diabetes mellitus    • Peripheral neuropathy    • Pure hyperglyceridemia    • Type 2 diabetes mellitus      Past Surgical History:   Procedure Laterality Date   • BACK SURGERY     • CARDIAC CATHETERIZATION     • CARDIAC CATHETERIZATION N/A 01/04/2021    Procedure: Left Heart Cath;  Surgeon: Martin Arreguin MD;  Location:  [facility];  Service: Cardiovascular;  Laterality: N/A;   • CARDIAC CATHETERIZATION N/A 06/25/2021    Procedure: Left Heart Cath;  Surgeon: Martin Arreguin MD;  Location:  [facility];  Service: Cardiovascular;  Laterality: N/A;   • CARDIAC DEFIBRILLATOR  PLACEMENT     • CARDIAC SURGERY     • CHOLECYSTECTOMY     • CORONARY ANGIOPLASTY     • CORONARY ARTERY BYPASS GRAFT     • CORONARY STENT PLACEMENT     • HERNIA REPAIR     • MOUTH SURGERY     • ORIF ULNA/RADIUS FRACTURES     • SPINAL CORD STIMULATOR IMPLANT     • SPINAL CORD STIMULATOR REMOVAL       Family History:  family history includes Diabetes in his father and mother; Heart attack in his brother, father, and paternal uncle; Heart disease in his brother, father, and paternal uncle; Hypertension in his brother and father; Stroke in his father.     Social History:  reports that he quit smoking about 41 years ago. His smoking use included cigarettes. He has never used smokeless tobacco. He reports that he does not currently use alcohol. He reports that he does not use drugs.  Social History     Social History Narrative   • Not on file     Medications:  HYDROcodone-acetaminophen, aspirin, atorvastatin, carvedilol, clopidogrel, empagliflozin, ezetimibe, gabapentin, insulin NPH-insulin regular, isosorbide mononitrate, levothyroxine, lisinopril, metFORMIN, nitroglycerin, omeprazole, potassium chloride, ranolazine, and zolpidem    Allergies   Allergen Reactions   • Sulfa Antibiotics      Objective   Objective     Vital Signs:   Temp:  [97.6 °F (36.4 °C)-98.4 °F (36.9 °C)] 98.4 °F (36.9 °C)  Heart Rate:  [71-87] 75  Resp:  [28-32] 28  BP: (129-171)/(78-99) 158/83  Flow (L/min):  [2] 2    Physical Exam     Constitutional: Awake, alert; non-toxic appearing   Eyes: PERRLA, sclerae anicteric, no conjunctival injection  HENT: NCAT, mucous membranes moist  Neck: Supple, no thyromegaly, no lymphadenopathy, trachea midline  Respiratory: Clear to auscultation bilaterally, mild increase in respiratory effort w/ pt taking frequent deep breaths   Cardiovascular: RRR, no murmurs, rubs, or gallops, trace pedal edema   Gastrointestinal: Positive bowel sounds, soft, nontender, nondistended  Musculoskeletal: Normal ROM bilaterally    Psychiatric: Appropriate affect, cooperative  Neurologic: Oriented x 3, strength symmetric in all extremities, Cranial Nerves grossly intact to confrontation, speech clear  Skin: No rashes, lesions or wounds     Result Review:  I have personally reviewed the results from the time of this admission to 5/8/2023 20:37 EDT and agree with these findings:  [x]  Laboratory list / accordion  []  Microbiology  [x]  Radiology  [x]  EKG/Telemetry   []  Cardiology/Vascular   []  Pathology  [x]  Old records    LAB RESULTS:      Lab 05/08/23  1338   WBC 4.45   HEMOGLOBIN 13.0   HEMATOCRIT 42.3   PLATELETS 186   NEUTROS ABS 3.06   IMMATURE GRANS (ABS) 0.01   LYMPHS ABS 0.73   MONOS ABS 0.55   EOS ABS 0.08   MCV 90.8         Lab 05/08/23  1338   SODIUM 137   POTASSIUM 5.0   CHLORIDE 102   CO2 19.0*   ANION GAP 16.0*   BUN 16   CREATININE 0.99   EGFR 79.4   GLUCOSE 133*   CALCIUM 9.4         Lab 05/08/23  1338   TOTAL PROTEIN 7.3   ALBUMIN 4.4   GLOBULIN 2.9   ALT (SGPT) 12   AST (SGOT) 17   BILIRUBIN 0.9   ALK PHOS 56   LIPASE 19         Lab 05/08/23  1653 05/08/23  1338   PROBNP  --  941.9   HSTROP T 32* 26*                 Brief Urine Lab Results  (Last result in the past 365 days)      Color   Clarity   Blood   Leuk Est   Nitrite   Protein   CREAT   Urine HCG        09/01/22 0858             65.6             Microbiology Results (last 10 days)     ** No results found for the last 240 hours. **        CT Angiogram Chest    Result Date: 5/8/2023  CT ANGIOGRAM CHEST Date of Exam: 5/8/2023 4:08 PM EDT Indication: CP, SOB. Comparison: None available. Technique: CTA of the chest was performed after the uneventful intravenous administration of 93 mL Isovue-370. Reconstructed coronal and sagittal images were also obtained. In addition, a 3-D volume rendered image was created for interpretation. Automated exposure control and iterative reconstruction methods were used. Findings: There is excellent pulmonary arterial opacification and  "there are no filling defects to suggest pulmonary embolic disease. There is severe native coronary artery calcific atherosclerosis. There is calcification along the anterior left ventricular free wall likely from previous infarct. There is mild bilateral basilar atelectasis, right greater than left. There is no pathologic adenopathy. 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 There are clips from cholecystectomy.     Impression: Impression: No evidence of pulmonary embolic disease. No active disease in the chest. Electronically Signed: Josesito Palomino  5/8/2023 4:37 PM EDT  Workstation ID: [id number]        Assessment & Plan   Assessment & Plan       Unstable angina    Type 2 diabetes mellitus with diabetic polyneuropathy, with long-term current use of insulin    Acquired hypothyroidism    Mixed hyperlipidemia    GERD without esophagitis    Peripheral neuropathy    Shortness of breath    Remington Alvarado is a 77 y.o. male w/ a hx of CAD (s/p CABG x2, multiple stents), HTN, HLD, T2DM, peripheral neuropathy, GERD, hypothyroidism who presented to the ED w/ c/o shortness of breath.     **Unstable angina   **Shortness of breath   **CAD (s/p multiple stents and CABG x 2)  **HTN, HLD   -last [facility] in [address]221 w/ Dr. Arreguin   -c/o dyspnea x 2-3 days (progressively worsening) and associated \"chest discomfort\"  -troponin 26, 32; continue to trend overnight  -EKG reviewed; repeat in am   -CTA Chest unremarkable   -proBNP 941.9  -nitroglycerin infusion started in ED; continue per protocol   -s/p ASA 324mg given in ED; continue 81mg daily  -continue Plavix daily   -continue statin   -continue coreg, lisinopril  -holding routine Ranexa and imdur while on nitro gtt  -will give Lasix 20mg IV x 1 dose for air hunger/dyspnea, pt placed on 2 liters NC (saturations stable in the 90's)  -hem a1c 6.5% on 4/13/23, tsh and FLP w/ am labs   -NPO after MN  -symptom mgt  -Cardiology consult in " am; known to Dr. Arreguin    **T2DM  **Peripheral neuropathy   -hem a1c 6.5% on 4/13/23  -holding routine Humulin, metformin; continue Jardiance  -FSBG q ac/hs w/ MDSS  -continue Gabapentin, PRN Norco     **GERD  -continue PPI    **Hypothyroidism   -tsh pending   -continue synthroid      DVT prophylaxis:  Lovenox     CODE STATUS:    Code Status (Patient has no pulse and is not breathing): CPR (Attempt to Resuscitate)  Medical Interventions (Patient has pulse or is breathing): Full Support    Expected Discharge  Expected Discharge Date: 5/10/2023; Expected Discharge Time:     Signature: Electronically signed by DAISY Barone, 05/08/23, 8:37 PM EDT.    Time spent: 55 minutes

## 2023-05-08 NOTE — ED PROVIDER NOTES
"Subjective   History of Present Illness  Pt is a 76 yo male presenting to ED with complaints of chest pain and SOB. PMHx significant for CAD, CABG, HTN, HLD, Hypothyroidism, Neuropathy, DM and Arthritis. Pt reports mid sternal chest pressure with SOB since yesterday. He has taken x5 NTG today which improve the CP but then the CP returns. He began vomiting on arrival to ED. She reports some diffuse swelling to hands / feet. He is followed by Dr. Arreguin. He reports hx of x2 CABG and \"too many to count\" stents. Last cath 6/2021. He reports this pain feels similar to prior episodes requiring stents. He denies tobacco, drug or ETOH use.     History provided by:  Medical records and patient      Review of Systems   Constitutional: Negative for chills and fever.   HENT: Negative for congestion.    Eyes: Negative for visual disturbance.   Respiratory: Positive for shortness of breath. Negative for cough.    Cardiovascular: Positive for chest pain and leg swelling.   Gastrointestinal: Positive for nausea and vomiting. Negative for abdominal pain and diarrhea.   Musculoskeletal: Negative for back pain.   Neurological: Negative for dizziness, syncope, weakness, numbness and headaches.   Psychiatric/Behavioral: Negative for confusion.       Past Medical History:   Diagnosis Date   • Arrhythmia    • Coronary artery disease    • Disease of thyroid gland    • GERD (gastroesophageal reflux disease)    • Heart attack    • Heart disease    • Hyperlipidemia    • Hypertension    • Hypothyroidism    • Neuropathy due to type 2 diabetes mellitus    • Peripheral neuropathy    • Pure hyperglyceridemia    • Type 2 diabetes mellitus        Allergies   Allergen Reactions   • Sulfa Antibiotics        Past Surgical History:   Procedure Laterality Date   • BACK SURGERY     • CARDIAC CATHETERIZATION     • CARDIAC CATHETERIZATION N/A 01/04/2021    Procedure: Left Heart Cath;  Surgeon: Martin Arreguin MD;  Location: [facility] CATH INVASIVE " LOCATION;  Service: Cardiovascular;  Laterality: N/A;   • CARDIAC CATHETERIZATION N/A 2021    Procedure: Left Heart Cath;  Surgeon: Martin Arreguin MD;  Location: [facility] CATH INVASIVE LOCATION;  Service: Cardiovascular;  Laterality: N/A;   • CARDIAC DEFIBRILLATOR PLACEMENT     • CARDIAC SURGERY     • CHOLECYSTECTOMY     • CORONARY ANGIOPLASTY     • CORONARY ARTERY BYPASS GRAFT     • CORONARY STENT PLACEMENT     • HERNIA REPAIR     • MOUTH SURGERY     • ORIF ULNA/RADIUS FRACTURES     • SPINAL CORD STIMULATOR IMPLANT     • SPINAL CORD STIMULATOR REMOVAL         Family History   Problem Relation Age of Onset   • Diabetes Mother    • Heart attack Father    • Heart disease Father    • Stroke Father    • Hypertension Father    • Diabetes Father    • Heart attack Brother    • Heart disease Brother    • Hypertension Brother    • Heart attack Paternal Uncle    • Heart disease Paternal Uncle        Social History     Socioeconomic History   • Marital status:    Tobacco Use   • Smoking status: Former     Types: Cigarettes     Quit date: 1981     Years since quittin.8   • Smokeless tobacco: Never   • Tobacco comments:     Quit 40 years ago   Vaping Use   • Vaping Use: Never used   Substance and Sexual Activity   • Alcohol use: Not Currently   • Drug use: Never   • Sexual activity: Not Currently           Objective   Physical Exam  Vitals and nursing note reviewed.   Constitutional:       Appearance: He is well-developed.   HENT:      Head: Atraumatic.      Nose: Nose normal.   Eyes:      General: Lids are normal.      Conjunctiva/sclera: Conjunctivae normal.      Pupils: Pupils are equal, round, and reactive to light.   Cardiovascular:      Rate and Rhythm: Normal rate and regular rhythm.      Heart sounds: Normal heart sounds.   Pulmonary:      Effort: Pulmonary effort is normal.      Breath sounds: Normal breath sounds.   Abdominal:      General: There is no distension.      Palpations: Abdomen  is soft.      Tenderness: There is no abdominal tenderness. There is no guarding or rebound.   Musculoskeletal:         General: No tenderness or deformity. Normal range of motion.      Cervical back: Normal range of motion and neck supple.   Skin:     General: Skin is warm and dry.   Neurological:      Mental Status: He is alert and oriented to person, place, and time.      Sensory: No sensory deficit.   Psychiatric:         Behavior: Behavior normal.         Procedures           ED Course  ED Course as of 05/08/23 2258   Mon May 08, 2023   1501 HS Troponin T(!): 26 [RT]   1501 proBNP: 941.9 [RT]   1508 Exchange explains Dr. Arreguin is checked out and have paged Dr. Carmona.  [RT]   1517 Discussed patient with Dr. Carmona. Will obtain repeat Trop and CTA. When resulted will call Dr. Carmona back to discuss plan. [RT]   1520 Spoke to Dr. Carmona and discussed normal CTA. Noted delta change greater than from with bump from 26 to 32. He is agreeable with admission to hospitalist and cards will consult.  [RT]   1727 HS Troponin T(!): 32 [RT]   1727 Troponin T Delta(!!): 6 [RT]      ED Course User Index  [RT] Zora Cano, PA            Recent Results (from the past 24 hour(s))   ECG 12 Lead ED Triage Standing Order; Chest Pain    Collection Time: 05/08/23  1:21 PM   Result Value Ref Range    QT Interval 388 ms    QTC Interval 436 ms   High Sensitivity Troponin T    Collection Time: 05/08/23  1:38 PM    Specimen: Blood   Result Value Ref Range    HS Troponin T 26 (H) <15 ng/L   Comprehensive Metabolic Panel    Collection Time: 05/08/23  1:38 PM    Specimen: Blood   Result Value Ref Range    Glucose 133 (H) 65 - 99 mg/dL    BUN 16 8 - 23 mg/dL    Creatinine 0.99 0.76 - 1.27 mg/dL    Sodium 137 136 - 145 mmol/L    Potassium 5.0 3.5 - 5.2 mmol/L    Chloride 102 98 - 107 mmol/L    CO2 19.0 (L) 22.0 - 29.0 mmol/L    Calcium 9.4 8.6 - 10.5 mg/dL    Total Protein 7.3 6.0 - 8.5 g/dL    Albumin 4.4 3.5 - 5.2 g/dL    ALT  (SGPT) 12 1 - 41 U/L    AST (SGOT) 17 1 - 40 U/L    Alkaline Phosphatase 56 39 - 117 U/L    Total Bilirubin 0.9 0.0 - 1.2 mg/dL    Globulin 2.9 gm/dL    A/G Ratio 1.5 g/dL    BUN/Creatinine Ratio 16.2 7.0 - 25.0    Anion Gap 16.0 (H) 5.0 - 15.0 mmol/L    eGFR 79.4 >60.0 mL/min/1.73   Lipase    Collection Time: 05/08/23  1:38 PM    Specimen: Blood   Result Value Ref Range    Lipase 19 13 - 60 U/L   BNP    Collection Time: 05/08/23  1:38 PM    Specimen: Blood   Result Value Ref Range    proBNP 941.9 0.0 - 1,800.0 pg/mL   Green Top (Gel)    Collection Time: 05/08/23  1:38 PM   Result Value Ref Range    Extra Tube Hold for add-ons.    Lavender Top    Collection Time: 05/08/23  1:38 PM   Result Value Ref Range    Extra Tube hold for add-on    Gold Top - SST    Collection Time: 05/08/23  1:38 PM   Result Value Ref Range    Extra Tube Hold for add-ons.    Gray Top    Collection Time: 05/08/23  1:38 PM   Result Value Ref Range    Extra Tube Hold for add-ons.    Light Blue Top    Collection Time: 05/08/23  1:38 PM   Result Value Ref Range    Extra Tube Hold for add-ons.    CBC Auto Differential    Collection Time: 05/08/23  1:38 PM    Specimen: Blood   Result Value Ref Range    WBC 4.45 3.40 - 10.80 10*3/mm3    RBC 4.66 4.14 - 5.80 10*6/mm3    Hemoglobin 13.0 13.0 - 17.7 g/dL    Hematocrit 42.3 37.5 - 51.0 %    MCV 90.8 79.0 - 97.0 fL    MCH 27.9 26.6 - 33.0 pg    MCHC 30.7 (L) 31.5 - 35.7 g/dL    RDW 14.6 12.3 - 15.4 %    RDW-SD 48.3 37.0 - 54.0 fl    MPV 9.4 6.0 - 12.0 fL    Platelets 186 140 - 450 10*3/mm3    Neutrophil % 68.8 42.7 - 76.0 %    Lymphocyte % 16.4 (L) 19.6 - 45.3 %    Monocyte % 12.4 (H) 5.0 - 12.0 %    Eosinophil % 1.8 0.3 - 6.2 %    Basophil % 0.4 0.0 - 1.5 %    Immature Grans % 0.2 0.0 - 0.5 %    Neutrophils, Absolute 3.06 1.70 - 7.00 10*3/mm3    Lymphocytes, Absolute 0.73 0.70 - 3.10 10*3/mm3    Monocytes, Absolute 0.55 0.10 - 0.90 10*3/mm3    Eosinophils, Absolute 0.08 0.00 - 0.40 10*3/mm3    Basophils,  "Absolute 0.02 0.00 - 0.20 10*3/mm3    Immature Grans, Absolute 0.01 0.00 - 0.05 10*3/mm3    nRBC 0.0 0.0 - 0.2 /100 WBC   High Sensitivity Troponin T 2Hr    Collection Time: 05/08/23  4:53 PM    Specimen: Blood   Result Value Ref Range    HS Troponin T 32 (H) <15 ng/L    Troponin T Delta 6 (C) >=-4 - <+4 ng/L   ECG 12 Lead ED Triage Standing Order; Chest Pain    Collection Time: 05/08/23  5:00 PM   Result Value Ref Range    QT Interval 386 ms    QTC Interval 434 ms   POC Glucose Once    Collection Time: 05/08/23  9:18 PM    Specimen: Blood   Result Value Ref Range    Glucose 140 (H) 70 - 130 mg/dL   High Sensitivity Troponin T    Collection Time: 05/08/23  9:42 PM    Specimen: Blood   Result Value Ref Range    HS Troponin T 34 (H) <15 ng/L     Note: In addition to lab results from this visit, the labs listed above may include labs taken at another facility or during a different encounter within the last 24 hours. Please correlate lab times with ED admission and discharge times for further clarification of the services performed during this visit.    CT Angiogram Chest   Final Result   Impression:   No evidence of pulmonary embolic disease. No active disease in the chest.            Electronically Signed: Josesito Palomino     5/8/2023 4:37 PM EDT     Workstation ID: [id number]        Vitals:    05/08/23 1926 05/08/23 1929 05/08/23 1955 05/08/23 2100   BP:  148/82 158/83    BP Location:   Left arm    Patient Position:   Lying    Pulse: 71 75 75 73   Resp:   28    Temp:   98.4 °F (36.9 °C)    TempSrc:   Oral    SpO2: 94% 98% 94% 95%   Weight:   87.4 kg (192 lb 9.6 oz)    Height:   179.8 cm (70.8\")      Medications   nitroglycerin (TRIDIL) 200 mcg/ml infusion (10 mcg/min Intravenous Rate/Dose Change 5/8/23 1927)   pantoprazole (PROTONIX) EC tablet 40 mg (has no administration in time range)   lisinopril (PRINIVIL,ZESTRIL) tablet 2.5 mg (has no administration in time range)   empagliflozin (JARDIANCE) tablet 25 mg (has no " administration in time range)   carvedilol (COREG) tablet 3.125 mg (has no administration in time range)   atorvastatin (LIPITOR) tablet 80 mg (80 mg Oral Given 5/8/23 2036)   aspirin EC tablet 81 mg (has no administration in time range)   clopidogrel (PLAVIX) tablet 75 mg (has no administration in time range)   sodium chloride 0.9 % flush 10 mL (10 mL Intravenous Given 5/8/23 2044)   sodium chloride 0.9 % flush 10 mL (has no administration in time range)   sodium chloride 0.9 % infusion 40 mL (has no administration in time range)   dextrose (GLUTOSE) oral gel 15 g (has no administration in time range)   dextrose (D50W) (25 g/50 mL) IV injection 25 g (has no administration in time range)   glucagon (GLUCAGEN) injection 1 mg (has no administration in time range)   Enoxaparin Sodium (LOVENOX) syringe 40 mg (40 mg Subcutaneous Given 5/8/23 2036)   acetaminophen (TYLENOL) tablet 650 mg (has no administration in time range)     Or   acetaminophen (TYLENOL) 160 MG/5ML solution 650 mg (has no administration in time range)     Or   acetaminophen (TYLENOL) suppository 650 mg (has no administration in time range)   Insulin Lispro (humaLOG) injection 0-14 Units (0 Units Subcutaneous Not Given 5/8/23 2241)   ondansetron (ZOFRAN) tablet 4 mg (has no administration in time range)     Or   ondansetron (ZOFRAN) injection 4 mg (has no administration in time range)   melatonin tablet 5 mg (has no administration in time range)   gabapentin (NEURONTIN) capsule 800 mg (has no administration in time range)   HYDROcodone-acetaminophen (NORCO)  MG per tablet 1 tablet (has no administration in time range)   levothyroxine (SYNTHROID, LEVOTHROID) tablet 75 mcg (has no administration in time range)   hydrocortisone 1 % cream 1 application (has no administration in time range)   aspirin chewable tablet 324 mg (324 mg Oral Given 5/8/23 1651)   ondansetron ODT (ZOFRAN-ODT) disintegrating tablet 4 mg (4 mg Oral Given 5/8/23 1340)   iopamidol  (ISOVUE-370) 76 % injection 93 mL (93 mL Intravenous Given 5/8/23 1620)   gabapentin (NEURONTIN) capsule 800 mg (800 mg Oral Given 5/8/23 1836)   HYDROcodone-acetaminophen (NORCO)  MG per tablet 1 tablet (1 tablet Oral Given 5/8/23 1836)   furosemide (LASIX) injection 20 mg (20 mg Intravenous Given 5/8/23 2036)   LORazepam (ATIVAN) injection 0.25 mg (0.25 mg Intravenous Given 5/8/23 2036)     ECG/EMG Results (last 24 hours)     Procedure Component Value Units Date/Time    ECG 12 Lead ED Triage Standing Order; Chest Pain [[id number]] Collected: 05/08/23 1321     Updated: 05/08/23 1338     QT Interval 388 ms      QTC Interval 436 ms     Narrative:      Test Reason : ED Triage Standing Order~  Blood Pressure :   */*   mmHG  Vent. Rate :  76 BPM     Atrial Rate :  76 BPM     P-R Int : 162 ms          QRS Dur :  92 ms      QT Int : 388 ms       P-R-T Axes :  -4  46  86 degrees     QTc Int : 436 ms    Normal sinus rhythm  Anteroseptal infarct (cited on or before 01-JUN-2014)  Abnormal ECG  When compared with ECG of 25-JUN-2021 10:48,  Questionable change in initial forces of Lateral leads  Confirmed by CAROL NICOLE MD ([id number]) on 5/8/2023 1:38:30 PM    Referred By:            Confirmed By: CAROL NICOLE MD        ECG 12 Lead ED Triage Standing Order; Chest Pain   Preliminary Result   Test Reason : ED Triage Standing Order~   Blood Pressure :   */*   mmHG   Vent. Rate :  76 BPM     Atrial Rate :  76 BPM      P-R Int : 162 ms          QRS Dur :  94 ms       QT Int : 386 ms       P-R-T Axes :  -9  32  95 degrees      QTc Int : 434 ms      Normal sinus rhythm   Anteroseptal infarct (cited on or before 01-JUN-2014)   Abnormal ECG   When compared with ECG of 08-MAY-2023 13:21,   No significant change was found      Referred By:            Confirmed By:       ECG 12 Lead ED Triage Standing Order; Chest Pain   Final Result   Test Reason : ED Triage Standing Order~   Blood Pressure :   */*   mmHG   Vent. Rate :  76 BPM      Atrial Rate :  76 BPM      P-R Int : 162 ms          QRS Dur :  92 ms       QT Int : 388 ms       P-R-T Axes :  -4  46  86 degrees      QTc Int : 436 ms      Normal sinus rhythm   Anteroseptal infarct (cited on or before 01-JUN-2014)   Abnormal ECG   When compared with ECG of 25-JUN-2021 10:48,   Questionable change in initial forces of Lateral leads   Confirmed by CAROL NICOLE MD ([id number]) on 5/8/2023 1:38:30 PM      Referred By:            Confirmed By: CAROL NICOLE MD                                            Medical Decision Making  Pt is a 76 yo male presenting to ED with complaints of CP and SOB. Pt has extensive cardiac hx including x2 CABG and multiple stents. HS Trop went from 26 to 32 with delta change. CTA chest NAD. Cardiologist Dr. Carmona on call for Dr. Arreguin recommends admission for further evaluation. Nitro drip ordered in ED. Patient agreeable with plan for admission and updated on ED workup.     Discussed patient with Dr. Nicole who is agreeable with ED course and tx plan.     DDx  ACS, Unstable angina, PE, Pneumonia, Dissection    History of coronary artery disease: chronic illness or injury  Unstable angina: acute illness or injury  Amount and/or Complexity of Data Reviewed  External Data Reviewed: notes.     Details: PCP, Cards   Labs: ordered. Decision-making details documented in ED Course.  Radiology: ordered. Decision-making details documented in ED Course.  ECG/medicine tests: ordered. Decision-making details documented in ED Course.      Risk  OTC drugs.  Prescription drug management.  Decision regarding hospitalization.          Final diagnoses:   Unstable angina   History of coronary artery disease       ED Disposition  ED Disposition     ED Disposition   Decision to Admit    Condition   --    Comment   Level of Care: Telemetry [5]   Diagnosis: Unstable angina [[id number]]   Admitting Physician: SINDY BELLAMY [[id number]]   Attending Physician: SINDY BELLAMY [[id number]]                No follow-up provider specified.       Medication List      No changes were made to your prescriptions during this visit.          Zora Cano PA  05/08/23 [id number]

## 2023-05-08 NOTE — Clinical Note
A 6 fr sheath was  inserted with ultrasound guidance into the right femoral artery. Sheath insertion not delayed.

## 2023-05-09 ENCOUNTER — APPOINTMENT (OUTPATIENT)
Dept: CARDIOLOGY | Facility: HOSPITAL | Age: 78
End: 2023-05-09
Payer: MEDICARE

## 2023-05-09 LAB
ANION GAP SERPL CALCULATED.3IONS-SCNC: 14 MMOL/L (ref 5–15)
APTT PPP: 31.8 SECONDS (ref 22–39)
BASOPHILS # BLD AUTO: 0.02 10*3/MM3 (ref 0–0.2)
BASOPHILS NFR BLD AUTO: 0.5 % (ref 0–1.5)
BH CV REST NUCLEAR ISOTOPE DOSE: 29.9 MCI
BH CV STRESS BP STAGE 1: NORMAL
BH CV STRESS BP STAGE 3: NORMAL
BH CV STRESS COMMENTS STAGE 1: NORMAL
BH CV STRESS DOSE REGADENOSON STAGE 1: 0.4
BH CV STRESS DURATION MIN STAGE 1: 1
BH CV STRESS DURATION MIN STAGE 2: 1
BH CV STRESS DURATION MIN STAGE 3: 1
BH CV STRESS DURATION MIN STAGE 4: 1
BH CV STRESS DURATION SEC STAGE 1: 0
BH CV STRESS DURATION SEC STAGE 2: 0
BH CV STRESS DURATION SEC STAGE 3: 0
BH CV STRESS DURATION SEC STAGE 4: 0
BH CV STRESS HR STAGE 1: 86
BH CV STRESS HR STAGE 2: 92
BH CV STRESS HR STAGE 3: 90
BH CV STRESS HR STAGE 4: 89
BH CV STRESS NUCLEAR ISOTOPE DOSE: 29.9 MCI
BH CV STRESS O2 STAGE 1: 99
BH CV STRESS O2 STAGE 2: 99
BH CV STRESS O2 STAGE 3: 98
BH CV STRESS O2 STAGE 4: 97
BH CV STRESS PROTOCOL 1: NORMAL
BH CV STRESS RECOVERY BP: NORMAL MMHG
BH CV STRESS RECOVERY HR: 86 BPM
BH CV STRESS RECOVERY O2: 97 %
BH CV STRESS STAGE 1: 1
BH CV STRESS STAGE 2: 2
BH CV STRESS STAGE 3: 3
BH CV STRESS STAGE 4: 4
BUN SERPL-MCNC: 16 MG/DL (ref 8–23)
BUN/CREAT SERPL: 15.8 (ref 7–25)
CALCIUM SPEC-SCNC: 9.5 MG/DL (ref 8.6–10.5)
CHLORIDE SERPL-SCNC: 99 MMOL/L (ref 98–107)
CHOLEST SERPL-MCNC: 99 MG/DL (ref 0–200)
CO2 SERPL-SCNC: 23 MMOL/L (ref 22–29)
CREAT SERPL-MCNC: 1.01 MG/DL (ref 0.76–1.27)
DEPRECATED RDW RBC AUTO: 48.6 FL (ref 37–54)
EGFRCR SERPLBLD CKD-EPI 2021: 77.6 ML/MIN/1.73
EOSINOPHIL # BLD AUTO: 0.11 10*3/MM3 (ref 0–0.4)
EOSINOPHIL NFR BLD AUTO: 2.7 % (ref 0.3–6.2)
ERYTHROCYTE [DISTWIDTH] IN BLOOD BY AUTOMATED COUNT: 14.6 % (ref 12.3–15.4)
GLUCOSE BLDC GLUCOMTR-MCNC: 126 MG/DL (ref 70–130)
GLUCOSE BLDC GLUCOMTR-MCNC: 128 MG/DL (ref 70–130)
GLUCOSE BLDC GLUCOMTR-MCNC: 143 MG/DL (ref 70–130)
GLUCOSE BLDC GLUCOMTR-MCNC: 151 MG/DL (ref 70–130)
GLUCOSE SERPL-MCNC: 121 MG/DL (ref 65–99)
HCT VFR BLD AUTO: 42.7 % (ref 37.5–51)
HDLC SERPL-MCNC: 27 MG/DL (ref 40–60)
HGB BLD-MCNC: 12.8 G/DL (ref 13–17.7)
IMM GRANULOCYTES # BLD AUTO: 0.01 10*3/MM3 (ref 0–0.05)
IMM GRANULOCYTES NFR BLD AUTO: 0.2 % (ref 0–0.5)
INR PPP: 1.05 (ref 0.89–1.12)
LDLC SERPL CALC-MCNC: 21 MG/DL (ref 0–100)
LDLC/HDLC SERPL: 0 {RATIO}
LYMPHOCYTES # BLD AUTO: 1.13 10*3/MM3 (ref 0.7–3.1)
LYMPHOCYTES NFR BLD AUTO: 28.2 % (ref 19.6–45.3)
MAGNESIUM SERPL-MCNC: 2.2 MG/DL (ref 1.6–2.4)
MAXIMAL PREDICTED HEART RATE: 145 BPM
MCH RBC QN AUTO: 27.1 PG (ref 26.6–33)
MCHC RBC AUTO-ENTMCNC: 30 G/DL (ref 31.5–35.7)
MCV RBC AUTO: 90.3 FL (ref 79–97)
MONOCYTES # BLD AUTO: 0.64 10*3/MM3 (ref 0.1–0.9)
MONOCYTES NFR BLD AUTO: 16 % (ref 5–12)
NEUTROPHILS NFR BLD AUTO: 2.1 10*3/MM3 (ref 1.7–7)
NEUTROPHILS NFR BLD AUTO: 52.4 % (ref 42.7–76)
NRBC BLD AUTO-RTO: 0 /100 WBC (ref 0–0.2)
PERCENT MAX PREDICTED HR: 63.45 %
PLATELET # BLD AUTO: 176 10*3/MM3 (ref 140–450)
PMV BLD AUTO: 9 FL (ref 6–12)
POTASSIUM SERPL-SCNC: 4.6 MMOL/L (ref 3.5–5.2)
PROTHROMBIN TIME: 13.8 SECONDS (ref 12.2–14.5)
QT INTERVAL: 386 MS
QT INTERVAL: 424 MS
QTC INTERVAL: 434 MS
QTC INTERVAL: 444 MS
RBC # BLD AUTO: 4.73 10*6/MM3 (ref 4.14–5.8)
SODIUM SERPL-SCNC: 136 MMOL/L (ref 136–145)
STRESS BASELINE BP: NORMAL MMHG
STRESS BASELINE HR: 76 BPM
STRESS O2 SAT REST: 95 %
STRESS PERCENT HR: 75 %
STRESS POST ESTIMATED WORKLOAD: 1 METS
STRESS POST EXERCISE DUR MIN: 4 MIN
STRESS POST EXERCISE DUR SEC: 0 SEC
STRESS POST O2 SAT PEAK: 99 %
STRESS POST PEAK BP: NORMAL MMHG
STRESS POST PEAK HR: 92 BPM
STRESS TARGET HR: 123 BPM
TRIGL SERPL-MCNC: 360 MG/DL (ref 0–150)
TSH SERPL DL<=0.05 MIU/L-ACNC: 3.36 UIU/ML (ref 0.27–4.2)
VLDLC SERPL-MCNC: 51 MG/DL (ref 5–40)
WBC NRBC COR # BLD: 4.01 10*3/MM3 (ref 3.4–10.8)

## 2023-05-09 PROCEDURE — 82948 REAGENT STRIP/BLOOD GLUCOSE: CPT

## 2023-05-09 PROCEDURE — A9555 RB82 RUBIDIUM: HCPCS | Performed by: PHYSICIAN ASSISTANT

## 2023-05-09 PROCEDURE — G0378 HOSPITAL OBSERVATION PER HR: HCPCS

## 2023-05-09 PROCEDURE — 93010 ELECTROCARDIOGRAM REPORT: CPT | Performed by: INTERNAL MEDICINE

## 2023-05-09 PROCEDURE — 80061 LIPID PANEL: CPT | Performed by: NURSE PRACTITIONER

## 2023-05-09 PROCEDURE — 0 RUBIDIUM CHLORIDE: Performed by: PHYSICIAN ASSISTANT

## 2023-05-09 PROCEDURE — 83735 ASSAY OF MAGNESIUM: CPT | Performed by: NURSE PRACTITIONER

## 2023-05-09 PROCEDURE — 85025 COMPLETE CBC W/AUTO DIFF WBC: CPT | Performed by: NURSE PRACTITIONER

## 2023-05-09 PROCEDURE — 85730 THROMBOPLASTIN TIME PARTIAL: CPT | Performed by: NURSE PRACTITIONER

## 2023-05-09 PROCEDURE — 99232 SBSQ HOSP IP/OBS MODERATE 35: CPT | Performed by: INTERNAL MEDICINE

## 2023-05-09 PROCEDURE — 25010000002 REGADENOSON 0.4 MG/5ML SOLUTION: Performed by: PHYSICIAN ASSISTANT

## 2023-05-09 PROCEDURE — 93017 CV STRESS TEST TRACING ONLY: CPT

## 2023-05-09 PROCEDURE — 84443 ASSAY THYROID STIM HORMONE: CPT | Performed by: NURSE PRACTITIONER

## 2023-05-09 PROCEDURE — 93005 ELECTROCARDIOGRAM TRACING: CPT | Performed by: NURSE PRACTITIONER

## 2023-05-09 PROCEDURE — 80048 BASIC METABOLIC PNL TOTAL CA: CPT | Performed by: NURSE PRACTITIONER

## 2023-05-09 PROCEDURE — 85610 PROTHROMBIN TIME: CPT | Performed by: NURSE PRACTITIONER

## 2023-05-09 PROCEDURE — 78431 MYOCRD IMG PET RST&STRS CT: CPT

## 2023-05-09 RX ORDER — LORAZEPAM 0.5 MG/1
0.5 TABLET ORAL ONCE
Status: COMPLETED | OUTPATIENT
Start: 2023-05-09 | End: 2023-05-09

## 2023-05-09 RX ORDER — LORAZEPAM 0.5 MG/1
0.5 TABLET ORAL 2 TIMES DAILY PRN
Status: DISCONTINUED | OUTPATIENT
Start: 2023-05-09 | End: 2023-05-11 | Stop reason: HOSPADM

## 2023-05-09 RX ORDER — LORAZEPAM 0.5 MG/1
0.5 TABLET ORAL ONCE
Status: DISCONTINUED | OUTPATIENT
Start: 2023-05-09 | End: 2023-05-09 | Stop reason: SDUPTHER

## 2023-05-09 RX ORDER — REGADENOSON 0.08 MG/ML
0.4 INJECTION, SOLUTION INTRAVENOUS ONCE
Status: COMPLETED | OUTPATIENT
Start: 2023-05-09 | End: 2023-05-09

## 2023-05-09 RX ORDER — SODIUM CHLORIDE 9 MG/ML
50 INJECTION, SOLUTION INTRAVENOUS CONTINUOUS
Status: DISCONTINUED | OUTPATIENT
Start: 2023-05-10 | End: 2023-05-10

## 2023-05-09 RX ADMIN — LISINOPRIL 2.5 MG: 2.5 TABLET ORAL at 13:21

## 2023-05-09 RX ADMIN — HYDROCODONE BITARTRATE AND ACETAMINOPHEN 1 TABLET: 10; 325 TABLET ORAL at 23:11

## 2023-05-09 RX ADMIN — CARVEDILOL 3.12 MG: 3.12 TABLET, FILM COATED ORAL at 13:21

## 2023-05-09 RX ADMIN — Medication 10 ML: at 21:00

## 2023-05-09 RX ADMIN — GABAPENTIN 800 MG: 400 CAPSULE ORAL at 09:37

## 2023-05-09 RX ADMIN — EMPAGLIFLOZIN 25 MG: 25 TABLET, FILM COATED ORAL at 16:42

## 2023-05-09 RX ADMIN — CARVEDILOL 3.12 MG: 3.12 TABLET, FILM COATED ORAL at 17:07

## 2023-05-09 RX ADMIN — RUBIDIUM CHLORIDE RB-82 1 DOSE: 150 INJECTION, SOLUTION INTRAVENOUS at 12:30

## 2023-05-09 RX ADMIN — RUBIDIUM CHLORIDE RB-82 1 DOSE: 150 INJECTION, SOLUTION INTRAVENOUS at 12:47

## 2023-05-09 RX ADMIN — LORAZEPAM 0.5 MG: 0.5 TABLET ORAL at 12:05

## 2023-05-09 RX ADMIN — SODIUM CHLORIDE 50 ML/HR: 0.9 INJECTION, SOLUTION INTRAVENOUS at 23:18

## 2023-05-09 RX ADMIN — HYDROCODONE BITARTRATE AND ACETAMINOPHEN 1 TABLET: 10; 325 TABLET ORAL at 09:37

## 2023-05-09 RX ADMIN — Medication 10 ML: at 13:22

## 2023-05-09 RX ADMIN — ASPIRIN 81 MG: 81 TABLET, COATED ORAL at 13:21

## 2023-05-09 RX ADMIN — REGADENOSON 0.4 MG: 0.08 INJECTION, SOLUTION INTRAVENOUS at 12:45

## 2023-05-09 RX ADMIN — GABAPENTIN 800 MG: 400 CAPSULE ORAL at 00:30

## 2023-05-09 RX ADMIN — GABAPENTIN 800 MG: 400 CAPSULE ORAL at 20:59

## 2023-05-09 RX ADMIN — CLOPIDOGREL BISULFATE 75 MG: 75 TABLET ORAL at 13:21

## 2023-05-09 RX ADMIN — GABAPENTIN 800 MG: 400 CAPSULE ORAL at 17:07

## 2023-05-09 RX ADMIN — LEVOTHYROXINE SODIUM 75 MCG: 75 TABLET ORAL at 05:26

## 2023-05-09 RX ADMIN — GABAPENTIN 800 MG: 400 CAPSULE ORAL at 13:25

## 2023-05-09 RX ADMIN — LORAZEPAM 0.5 MG: 0.5 TABLET ORAL at 16:41

## 2023-05-09 RX ADMIN — HYDROCORTISONE 1 APPLICATION: 1 CREAM TOPICAL at 19:53

## 2023-05-09 RX ADMIN — ATORVASTATIN CALCIUM 80 MG: 40 TABLET, FILM COATED ORAL at 20:59

## 2023-05-09 RX ADMIN — LORAZEPAM 0.5 MG: 0.5 TABLET ORAL at 23:11

## 2023-05-09 RX ADMIN — PANTOPRAZOLE SODIUM 40 MG: 40 TABLET, DELAYED RELEASE ORAL at 05:26

## 2023-05-09 NOTE — PLAN OF CARE
Goal Outcome Evaluation:  Plan of Care Reviewed With: patient        Progress: no change  Outcome Evaluation: vss, CARLOS-chip on tele, medicated for pain and anxiety as ordered with good response. POC explained, consented for heart cath in am. NPO after midnight,

## 2023-05-09 NOTE — CONSULTS
Cardiology Consult - [facility]    Remington Alvarado     S224/1  1945  [address] [facility] [address]         Admission Date:  5/8/2023    Consultation Date:  05/09/23        PCP:  Disha Velez DO  Referring MD:  Dr. Nahid Box  Consulting MD:  Dr. Carmona  Primary Cardiologist:  Dr. Arreguin        CC:  Chest Pain    Reason for Consult: Chest Pain          Allergies:  is allergic to sulfa antibiotics.    Medications Prior to Admission   Medication Sig Dispense Refill Last Dose   • aspirin 81 MG EC tablet Take 1 tablet by mouth Daily.   5/8/2023 at 0800   • atorvastatin (LIPITOR) 80 MG tablet Take 1 tablet by mouth Every Night.   5/8/2023 at 2000   • carvedilol (COREG) 3.125 MG tablet Take 1 tablet by mouth 2 (Two) Times a Day With Meals.   5/8/2023 at 0800   • clopidogrel (PLAVIX) 75 MG tablet Take 1 tablet by mouth Daily. 30 tablet 11 5/7/2023 at 1300   • empagliflozin (Jardiance) 25 MG tablet tablet Take 1 tablet by mouth Daily. 90 tablet 2 5/7/2023 at 1300   • ezetimibe (ZETIA) 10 MG tablet Take 1 tablet by mouth Daily.   5/7/2023 at 1300   • gabapentin (NEURONTIN) 800 MG tablet Take 1 tablet by mouth 4 (Four) Times a Day.   5/8/2023   • HYDROcodone-acetaminophen (NORCO)  MG per tablet Take 1 tablet by mouth 3 (Three) Times a Day.   5/8/2023   • insulin NPH-insulin regular (humuLIN 70/30,novoLIN 70/30) (70-30) 100 UNIT/ML injection Inject 68 Units under the skin into the appropriate area as directed 2 (Two) Times a Day With Meals. (Patient taking differently: Inject 62 Units under the skin into the appropriate area as directed 2 (Two) Times a Day With Meals.) 40 mL 6 5/7/2023 at 1800   • isosorbide mononitrate (IMDUR) 60 MG 24 hr tablet Take 1 tablet by mouth Daily.   5/7/2023 at 1300   • levothyroxine (SYNTHROID, LEVOTHROID) 75 MCG tablet Take 1 tablet by mouth Daily. 90 tablet 2 5/8/2023 at 0600   • lisinopril (PRINIVIL,ZESTRIL) 5 MG tablet Take  2.5 mg by mouth Daily.   5/8/2023   • metFORMIN (GLUCOPHAGE) 1000 MG tablet Take 1 tablet by mouth 2 (Two) Times a Day With Meals. 180 tablet 2 5/8/2023 at 0800   • nitroglycerin (NITROSTAT) 0.4 MG SL tablet Place 1 tablet under the tongue Every 5 (Five) Minutes As Needed.   5/8/2023   • omeprazole (priLOSEC) 40 MG capsule Take 1 capsule by mouth Daily.   5/8/2023 at 0800   • potassium chloride (K-DUR,KLOR-CON) 20 MEQ CR tablet Take 1 tablet by mouth Daily.   5/8/2023 at 1300   • ranolazine (RANEXA) 1000 MG 12 hr tablet Take 1 tablet by mouth 2 (Two) Times a Day.   5/8/2023 at 0800   • zolpidem (AMBIEN) 5 MG tablet Take 1 tablet by mouth At Night As Needed for Sleep.   5/7/2023 at 2000       nitroglycerin, 5-200 mcg/min, Last Rate: Stopped (05/09/23 0144)        HPI:  Remington Alvarado is a 77-year-old  male with history of CAD (CABG x2, multiple stents), HTN, HLP, DM T2 with neuropathy, GERD, hypothyroidism, former tobacco abuse.  His last cath was June 2021 where he underwent PTCA to the LIMA to LAD.  Left main, left circumflex are known to be occluded with 100% mid RCA occlusion.  He has a patent saphenous vein graft to the OM and patent saphenous vein graft to the PDA distal to the bifurcation.  LIMA to LAD has eccentric scar tissue in the mid vessel that was angioplastied x3.  He presents to [facility] ED last night with a 3-day history of progressive chest pain.  He did notify our office and was advised to come to the ER.  The pain is midsternal chest area that is worse with breathing.  He describes it as a pressure-like sensation that is somewhat different than what he had previously.  He also admits to some bilateral lower extremity edema which has waxed and waned.  His initial troponin was 26, rising to 32.  EKG showed no acute changes.  He was admitted to hospital service and cardiology has been consulted.  At time of consultation, he is chest pain-free and very anxious about plan of care.  He reports  "full compliance with medications.  Denies any recent changes to his meds, denies any recent illnesses or fevers.          Social History     Socioeconomic History   • Marital status:    Tobacco Use   • Smoking status: Former     Types: Cigarettes     Quit date: 1981     Years since quittin.8   • Smokeless tobacco: Never   • Tobacco comments:     Quit 40 years ago   Vaping Use   • Vaping Use: Never used   Substance and Sexual Activity   • Alcohol use: Not Currently   • Drug use: Never   • Sexual activity: Not Currently     Family History   Problem Relation Age of Onset   • Diabetes Mother    • Heart attack Father    • Heart disease Father    • Stroke Father    • Hypertension Father    • Diabetes Father    • Heart attack Brother    • Heart disease Brother    • Hypertension Brother    • Heart attack Paternal Uncle    • Heart disease Paternal Uncle      Past Surgical History:   Procedure Laterality Date   • BACK SURGERY     • CARDIAC CATHETERIZATION     • CARDIAC CATHETERIZATION N/A 2021    Procedure: Left Heart Cath;  Surgeon: Martin Arreguin MD;  Location:  [facility];  Service: Cardiovascular;  Laterality: N/A;   • CARDIAC CATHETERIZATION N/A 2021    Procedure: Left Heart Cath;  Surgeon: Martin Arreguin MD;  Location:  [facility];  Service: Cardiovascular;  Laterality: N/A;   • CARDIAC DEFIBRILLATOR PLACEMENT     • CARDIAC SURGERY     • CHOLECYSTECTOMY     • CORONARY ANGIOPLASTY     • CORONARY ARTERY BYPASS GRAFT     • CORONARY STENT PLACEMENT     • HERNIA REPAIR     • MOUTH SURGERY     • ORIF ULNA/RADIUS FRACTURES     • SPINAL CORD STIMULATOR IMPLANT     • SPINAL CORD STIMULATOR REMOVAL       ROS: Pertinent items are noted in HPI, all other systems reviewed and negative     Objective     height is 179.8 cm (70.8\") and weight is 87.4 kg (192 lb 9.6 oz). His oral temperature is 98.4 °F (36.9 °C). His blood pressure is 134/77 and his pulse is " 71. His respiration is 20 and oxygen saturation is 97%.      Intake/Output Summary (Last 24 hours) at 5/9/2023 1130  Last data filed at 5/9/2023 0759  Gross per 24 hour   Intake --   Output 2225 ml   Net -2225 ml     Intake/Output                       05/08/23 0701 - 05/09/23 0700 05/09/23 0701 - 05/10/23 0700     6832-5798 1262-1269 Total 9220-1609 5219-6412 Total                 Intake    Total Intake -- -- -- -- -- --       Output    Urine  --  1750 1750  475  -- 475    Total Output -- 1750 1750 475 -- 475             05/08/23  1315 05/08/23 1955   Weight: 92.1 kg (203 lb) 87.4 kg (192 lb 9.6 oz)          Physical Exam:  General Appearance:    Alert, cooperative, in no acute distress   Head:    Normocephalic, without obvious abnormality, atraumatic   Eyes:            Lids and lashes normal, conjunctivae and sclerae normal, no   icterus, no pallor, corneas clear, PERRLA   Ears:    Ears appear intact with no abnormalities noted   Throat:   No oral lesions, no thrush, oral mucosa moist   Neck:   No adenopathy, supple, trachea midline, no thyromegaly, no carotid bruit, no JVD   Back:     No kyphosis present, no scoliosis present, no skin lesions,    erythema or scars, no tenderness to percussion or                   palpation, range of motion normal   Lungs:     Clear to auscultation,respirations regular, even and               unlabored    Heart:    Regular rhythm and normal rate, normal S1 and S2, no         murmur, no gallop, no rub, no click   Abdomen:     Normal bowel sounds, no masses, no organomegaly, soft     nontender, nondistended, no guarding, no rebound   tenderness   Extremities:   Moves all extremities well,  no cyanosis, no redness, no edema   Pulses:   Pulses palpable and equal bilaterally   Skin:   No bleeding, bruising or rash   Lymph nodes:   No palpable adenopathy   Neurologic:   Cranial nerves 2 - 12 grossly intact, sensation intact, DTR     present and equal bilaterally          Results  Review:  I personally reviewed the patient's clinical results.  Results from last 7 days   Lab Units 05/09/23  0601   WBC 10*3/mm3 4.01   HEMOGLOBIN g/dL 12.8*   HEMATOCRIT % 42.7   PLATELETS 10*3/mm3 176     Results from last 7 days   Lab Units 05/09/23  0601 05/08/23  1338   SODIUM mmol/L 136 137   POTASSIUM mmol/L 4.6 5.0   CHLORIDE mmol/L 99 102   CO2 mmol/L 23.0 19.0*   BUN mg/dL 16 16   CREATININE mg/dL 1.01 0.99   CALCIUM mg/dL 9.5 9.4   BILIRUBIN mg/dL  --  0.9   ALK PHOS U/L  --  56   ALT (SGPT) U/L  --  12   AST (SGOT) U/L  --  17   GLUCOSE mg/dL 121* 133*     Results from last 7 days   Lab Units 05/09/23  0601   SODIUM mmol/L 136   POTASSIUM mmol/L 4.6   CHLORIDE mmol/L 99   CO2 mmol/L 23.0   BUN mg/dL 16   CREATININE mg/dL 1.01   GLUCOSE mg/dL 121*   CALCIUM mg/dL 9.5     Results from last 7 days   Lab Units 05/09/23  0601   INR  1.05     Results from last 7 days   Lab Units 05/09/23  0601   TSH uIU/mL 3.360     Results from last 7 days   Lab Units 05/09/23  0601   CHOLESTEROL mg/dL 99   TRIGLYCERIDES mg/dL 360*   HDL CHOL mg/dL 27*   LDL CHOL mg/dL 21     Results from last 7 days   Lab Units 05/08/23  1338   PROBNP pg/mL 941.9             Radiology:  Imaging Results (Last 72 Hours)     Procedure Component Value Units Date/Time    CT Angiogram Chest [[id number]] Collected: 05/08/23 1631     Updated: 05/08/23 1640    Narrative:      CT ANGIOGRAM CHEST    Date of Exam: 5/8/2023 4:08 PM EDT    Indication: CP, SOB.    Comparison: None available.    Technique: CTA of the chest was performed after the uneventful intravenous administration of 93 mL Isovue-370. Reconstructed coronal and sagittal images were also obtained. In addition, a 3-D volume rendered image was created for interpretation.   Automated exposure control and iterative reconstruction methods were used.    Findings:  There is excellent pulmonary arterial opacification and there are no filling defects to suggest pulmonary embolic disease. There is  severe native coronary artery calcific atherosclerosis. There is calcification along the anterior left ventricular free   wall likely from previous infarct. There is mild bilateral basilar atelectasis, right greater than left. There is no pathologic adenopathy. 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 There are   clips from cholecystectomy.      Impression:      Impression:  No evidence of pulmonary embolic disease. No active disease in the chest.        Electronically Signed: Josesito Palomino    5/8/2023 4:37 PM EDT    Workstation ID: [id number]            Tele: Normal sinus rhythm    Assessment:  -77-year-old  male with known CAD (CABG x2, multiple stents) presents [facility] ED with chest pain, exertional dyspnea worrisome for angina.  Admitted to hospitalist service.  No EKG changes, mild elevation in troponin which is stable.  -HTN  -HLP  -DM 2  -GERD  -Hypothyroidism  -Anxiety          Plan:  -Admitted for observation to hospitalist service.  -We will proceed with nuclear PET today.  Risk and benefits were reviewed with patient.  Antianxiety meds per primary service.  Further recommendations based on results.  -We will plan on keeping patient here post study, allow patient to eat post study.  -Continue DAPT, high intensity statin, Coreg, Jardiance, ACE inhibitor.  -Monitor glucose levels.    I discussed the patient's findings and my recommendations with the patient, any present family members, and the nursing staff.  Sang Carmona MD saw and examined patient, verified hx and PE, read all radiographic studies, reviewed labs and micro data, and formulated dx, plan for treatment and all medical decision making.      Yoon Santamaria PA-C, working with Sang Carmona MD  05/09/23 11:30 EDT

## 2023-05-09 NOTE — PROGRESS NOTES
[facility] Medicine Services  PROGRESS NOTE    Patient Name: Remington Alvarado  : 1945  MRN: [id number]    Date of Admission: 2023  Primary Care Physician: Disha Velez DO    Subjective   Subjective     CC:  Chest pain    HPI:  No current chest pain or dyspnea    ROS:  No f/c  No dysuria  No focal weakness  No cp  No palpitations  No dyspnea    Objective   Objective     Vital Signs:   Temp:  [97.8 °F (36.6 °C)-98.6 °F (37 °C)] 98.6 °F (37 °C)  Heart Rate:  [69-89] 80  Resp:  [18-28] 18  BP: (110-162)/(69-94) 110/69  Flow (L/min):  [2] 2     Physical Exam:  Constitutional:Alert, oriented x 3, nontoxic appearing  Psych:Normal/appropriate affect  HEENT:NCAT, oropharynx clear  Neck: neck supple, full range of motion  Neuro: Face symmetric, speech clear, equal , moves all extremities  Cardiac: RRR; No pretibial pitting edema  Resp: CTAB, normal effort  GI: abd soft, nontender  Skin: No extremity rash  Musculoskeletal/extremities: no cyanosis of extremities; no significant ankle edema            Results Reviewed:  LAB RESULTS:      Lab 23  0601 23  1338   WBC 4.01 4.45   HEMOGLOBIN 12.8* 13.0   HEMATOCRIT 42.7 42.3   PLATELETS 176 186   NEUTROS ABS 2.10 3.06   IMMATURE GRANS (ABS) 0.01 0.01   LYMPHS ABS 1.13 0.73   MONOS ABS 0.64 0.55   EOS ABS 0.11 0.08   MCV 90.3 90.8   PROTIME 13.8  --    APTT 31.8  --          Lab 23  0601 23  1338   SODIUM 136 137   POTASSIUM 4.6 5.0   CHLORIDE 99 102   CO2 23.0 19.0*   ANION GAP 14.0 16.0*   BUN 16 16   CREATININE 1.01 0.99   EGFR 77.6 79.4   GLUCOSE 121* 133*   CALCIUM 9.5 9.4   MAGNESIUM 2.2  --    TSH 3.360  --          Lab 23  1338   TOTAL PROTEIN 7.3   ALBUMIN 4.4   GLOBULIN 2.9   ALT (SGPT) 12   AST (SGOT) 17   BILIRUBIN 0.9   ALK PHOS 56   LIPASE 19         Lab 23  0601 23  2142 23  1653 23  1338   PROBNP  --   --   --  941.9   HSTROP T  --  34* 32* 26*   PROTIME 13.8   --   --   --    INR 1.05  --   --   --          Lab 05/09/23  0601   CHOLESTEROL 99   LDL CHOL 21   HDL CHOL 27*   TRIGLYCERIDES 360*             Brief Urine Lab Results  (Last result in the past 365 days)      Color   Clarity   Blood   Leuk Est   Nitrite   Protein   CREAT   Urine HCG        05/08/23 2301 Yellow   Clear   Trace   Negative   Negative   Negative                 Microbiology Results Abnormal     None          CT Angiogram Chest    Result Date: 5/8/2023  CT ANGIOGRAM CHEST Date of Exam: 5/8/2023 4:08 PM EDT Indication: CP, SOB. Comparison: None available. Technique: CTA of the chest was performed after the uneventful intravenous administration of 93 mL Isovue-370. Reconstructed coronal and sagittal images were also obtained. In addition, a 3-D volume rendered image was created for interpretation. Automated exposure control and iterative reconstruction methods were used. Findings: There is excellent pulmonary arterial opacification and there are no filling defects to suggest pulmonary embolic disease. There is severe native coronary artery calcific atherosclerosis. There is calcification along the anterior left ventricular free wall likely from previous infarct. There is mild bilateral basilar atelectasis, right greater than left. There is no pathologic adenopathy. 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 There are clips from cholecystectomy.     Impression: Impression: No evidence of pulmonary embolic disease. No active disease in the chest. Electronically Signed: Josesito Palomino  5/8/2023 4:37 PM EDT  Workstation ID: [id number]    Stress Test With Pet Myocardial Perfusion    Result Date: 5/9/2023  •  Patient denied any chest discomfort/pain during stress; did report a developing headache after the Lexiscan was given. •  Inverted T wave in inferolateral leads, after the Lexiscan was given.  No significant ST changes identified. •  REST EF = 31%  STRESS EF =  34%. •  Dilated left ventricle •  Large anterior apical defect--mixed •  Intermediate risk study with both scar and ischemia noted in the anterior wall •  Clinical correlation is required           Current medications:  Scheduled Meds:aspirin, 81 mg, Oral, Daily  atorvastatin, 80 mg, Oral, Nightly  carvedilol, 3.125 mg, Oral, BID With Meals  clopidogrel, 75 mg, Oral, Daily  empagliflozin, 25 mg, Oral, Daily  enoxaparin, 40 mg, Subcutaneous, Daily  gabapentin, 800 mg, Oral, 4x Daily  insulin lispro, 0-14 Units, Subcutaneous, 4x Daily With Meals & Nightly  levothyroxine, 75 mcg, Oral, Q AM  lisinopril, 2.5 mg, Oral, Daily  LORazepam, 0.5 mg, Oral, Once  pantoprazole, 40 mg, Oral, Q AM  sodium chloride, 10 mL, Intravenous, Q12H      Continuous Infusions:nitroglycerin, 5-200 mcg/min, Last Rate: Stopped (05/09/23 0144)  [START ON 5/10/2023] sodium chloride, 50 mL/hr      PRN Meds:.•  acetaminophen **OR** acetaminophen **OR** acetaminophen  •  dextrose  •  dextrose  •  glucagon (human recombinant)  •  HYDROcodone-acetaminophen  •  hydrocortisone  •  LORazepam  •  melatonin  •  ondansetron **OR** ondansetron  •  sodium chloride  •  sodium chloride    Assessment & Plan   Assessment & Plan     Active Hospital Problems    Diagnosis  POA   • **Unstable angina [I20.0]  Yes   • GERD without esophagitis [K21.9]  Yes   • Peripheral neuropathy [G62.9]  Yes   • Shortness of breath [R06.02]  Yes   • Type 2 diabetes mellitus with diabetic polyneuropathy, with long-term current use of insulin [E11.42, Z79.4]  Not Applicable   • Acquired hypothyroidism [E03.9]  Yes   • Mixed hyperlipidemia [E78.2]  Yes      Resolved Hospital Problems   No resolved problems to display.        Brief Hospital Course to date:  Remington Alvarado is a 77 y.o. male w/ hx CAD (s/p cabg x 2, multiple stens), htn, hl, dm2, gerd, hypothyroidism who prsented w/ [facility] ED w/ intermittent chest pressure and dyspnea. Ct angio chest negative for PE. Cardiology  consulted. PET stress ordered 5/9/23 which was indetermediate risk study w/ resting ef 31%, stress EF 34%.       Chest pain/Possible unstable angina  Abnormal stress test (indeterminant risk PET stress)  Hx CAD (multiple previous stents & previous CABG x 2)  HFrEF (ef 31% at rest during PET stress)  HTN  HL  -cta chest negative  -continue asa, plavix, coreg, lisinopril, statin, jardiance  -cards (Dr. Carmona) following: planning [facility] tomorrow 5/10/23 morning    DM2  Peripheral neuropathy  a1c 6.5 on 4/13/23  -continue jardiance, sliding scale humalog, titrate prn    Gerd  -ppi    Hypothyroidism  -tsh ok; continue synthroid      Expected Discharge Location and Transportation: home  Expected Discharge ? 5/10/23  Expected Discharge Date: 5/10/2023; Expected Discharge Time:      DVT prophylaxis:  Medical DVT prophylaxis orders are present.     AM-PAC 6 Clicks Score (PT): 24 (05/09/23 0745)    CODE STATUS:   Code Status and Medical Interventions:   Ordered at: 05/08/23 2000     Code Status (Patient has no pulse and is not breathing):    CPR (Attempt to Resuscitate)     Medical Interventions (Patient has pulse or is breathing):    Full Support       Kareem Bass MD  05/09/23

## 2023-05-09 NOTE — PLAN OF CARE
Goal Outcome Evaluation:              Outcome Evaluation: VSS.  On 2L O2 NC.  A&O x4.  Patient's chest discomfort has resolved and nitro drip off with .  No other complaints at this time.  1x dose of lorazepam given for anxiety.  Urine collected and sent to lab.  Cardiology consulted.  Will continue to follow plan of care.  Patient has been NPO since 0030.

## 2023-05-09 NOTE — CASE MANAGEMENT/SOCIAL WORK
Continued Stay Note  [facility]     Patient Name: Remington Alvarado  MRN: [id number]  Today's Date: 5/9/2023    Admit Date: 5/8/2023    Plan: home   Discharge Plan     Row Name 05/09/23 1203       Plan    Plan home    Patient/Family in Agreement with Plan yes    Plan Comments Mr. Alvarado resides in [place] with his significant other.   He is independent with ADL's and does not use any DME.  He is not current with home health or PT.  His PCP is Disha Velez.  I verified his insurance and that he has prescription coverage.  Plan is home    Final Discharge Disposition Code 01 - home or self-care               Discharge Codes    No documentation.               Expected Discharge Date and Time     Expected Discharge Date Expected Discharge Time    May 10, 2023             Clau Pedersen RN

## 2023-05-10 ENCOUNTER — APPOINTMENT (OUTPATIENT)
Dept: CARDIOLOGY | Facility: HOSPITAL | Age: 78
End: 2023-05-10
Payer: MEDICARE

## 2023-05-10 ENCOUNTER — APPOINTMENT (OUTPATIENT)
Dept: GENERAL RADIOLOGY | Facility: HOSPITAL | Age: 78
End: 2023-05-10
Payer: MEDICARE

## 2023-05-10 LAB
ASCENDING AORTA: 3.4 CM
BH CV ECHO MEAS - AO MAX PG: 6 MMHG
BH CV ECHO MEAS - AO MEAN PG: 3 MMHG
BH CV ECHO MEAS - AO ROOT AREA (BSA CORRECTED): 1.4 CM2
BH CV ECHO MEAS - AO ROOT DIAM: 2.9 CM
BH CV ECHO MEAS - AO V2 MAX: 122 CM/SEC
BH CV ECHO MEAS - AO V2 VTI: 21.5 CM
BH CV ECHO MEAS - AVA(I,D): 2.36 CM2
BH CV ECHO MEAS - EDV(CUBED): 244.1 ML
BH CV ECHO MEAS - EDV(MOD-SP2): 177.2 ML
BH CV ECHO MEAS - EDV(MOD-SP4): 116.2 ML
BH CV ECHO MEAS - EF(MOD-SP2): 50.7 %
BH CV ECHO MEAS - EF(MOD-SP4): 49.3 %
BH CV ECHO MEAS - ESV(CUBED): 79.5 ML
BH CV ECHO MEAS - ESV(MOD-SP2): 87.3 ML
BH CV ECHO MEAS - ESV(MOD-SP4): 59 ML
BH CV ECHO MEAS - FS: 31.2 %
BH CV ECHO MEAS - IVS/LVPW: 1 CM
BH CV ECHO MEAS - IVSD: 1 CM
BH CV ECHO MEAS - LA DIMENSION: 4.3 CM
BH CV ECHO MEAS - LAT PEAK E' VEL: 7.6 CM/SEC
BH CV ECHO MEAS - LV MASS(C)D: 264.7 GRAMS
BH CV ECHO MEAS - LV MAX PG: 3.3 MMHG
BH CV ECHO MEAS - LV MEAN PG: 1.5 MMHG
BH CV ECHO MEAS - LV V1 MAX: 91.3 CM/SEC
BH CV ECHO MEAS - LV V1 VTI: 15.4 CM
BH CV ECHO MEAS - LVIDD: 6.3 CM
BH CV ECHO MEAS - LVIDS: 4.3 CM
BH CV ECHO MEAS - LVOT AREA: 3.3 CM2
BH CV ECHO MEAS - LVOT DIAM: 2.05 CM
BH CV ECHO MEAS - LVPWD: 1 CM
BH CV ECHO MEAS - MED PEAK E' VEL: 7.7 CM/SEC
BH CV ECHO MEAS - MV A MAX VEL: 113 CM/SEC
BH CV ECHO MEAS - MV DEC SLOPE: 235 CM/SEC2
BH CV ECHO MEAS - MV DEC TIME: 0.22 MSEC
BH CV ECHO MEAS - MV E MAX VEL: 70.2 CM/SEC
BH CV ECHO MEAS - MV E/A: 0.62
BH CV ECHO MEAS - MV P1/2T: 95.3 MSEC
BH CV ECHO MEAS - MVA(P1/2T): 2.31 CM2
BH CV ECHO MEAS - PA ACC TIME: 0.12 SEC
BH CV ECHO MEAS - PA PR(ACCEL): 26.8 MMHG
BH CV ECHO MEAS - SV(LVOT): 50.8 ML
BH CV ECHO MEAS - SV(MOD-SP2): 89.9 ML
BH CV ECHO MEAS - SV(MOD-SP4): 57.3 ML
BH CV ECHO MEAS - TAPSE (>1.6): 2 CM
BH CV ECHO MEAS - TR MAX PG: 19.5 MMHG
BH CV ECHO MEAS - TR MAX VEL: 221 CM/SEC
BH CV ECHO MEASUREMENTS AVERAGE E/E' RATIO: 9.18
BH CV VAS BP LEFT ARM: NORMAL MMHG
BH CV XLRA - RV BASE: 3.2 CM
BH CV XLRA - RV LENGTH: 7.1 CM
BH CV XLRA - RV MID: 2.7 CM
BH CV XLRA - TDI S': 8.7 CM/SEC
GLUCOSE BLDC GLUCOMTR-MCNC: 121 MG/DL (ref 70–130)
GLUCOSE BLDC GLUCOMTR-MCNC: 136 MG/DL (ref 70–130)
GLUCOSE BLDC GLUCOMTR-MCNC: 146 MG/DL (ref 70–130)
GLUCOSE BLDC GLUCOMTR-MCNC: 200 MG/DL (ref 70–130)
LEFT ATRIUM VOLUME INDEX: 25 ML/M2
MAXIMAL PREDICTED HEART RATE: 145 BPM
STRESS TARGET HR: 123 BPM

## 2023-05-10 PROCEDURE — C1894 INTRO/SHEATH, NON-LASER: HCPCS | Performed by: INTERNAL MEDICINE

## 2023-05-10 PROCEDURE — 93459 L HRT ART/GRFT ANGIO: CPT | Performed by: INTERNAL MEDICINE

## 2023-05-10 PROCEDURE — 71045 X-RAY EXAM CHEST 1 VIEW: CPT

## 2023-05-10 PROCEDURE — 4A023N7 MEASUREMENT OF CARDIAC SAMPLING AND PRESSURE, LEFT HEART, PERCUTANEOUS APPROACH: ICD-10-PCS | Performed by: INTERNAL MEDICINE

## 2023-05-10 PROCEDURE — 25010000002 MIDAZOLAM PER 1 MG: Performed by: INTERNAL MEDICINE

## 2023-05-10 PROCEDURE — 93306 TTE W/DOPPLER COMPLETE: CPT

## 2023-05-10 PROCEDURE — 25010000002 FENTANYL CITRATE (PF) 50 MCG/ML SOLUTION: Performed by: INTERNAL MEDICINE

## 2023-05-10 PROCEDURE — C1760 CLOSURE DEV, VASC: HCPCS | Performed by: INTERNAL MEDICINE

## 2023-05-10 PROCEDURE — B34JZZZ ULTRASONOGRAPHY OF LEFT UPPER EXTREMITY ARTERIES: ICD-10-PCS | Performed by: INTERNAL MEDICINE

## 2023-05-10 PROCEDURE — 99232 SBSQ HOSP IP/OBS MODERATE 35: CPT | Performed by: NURSE PRACTITIONER

## 2023-05-10 PROCEDURE — 25010000002 SULFUR HEXAFLUORIDE MICROSPH 60.7-25 MG RECONSTITUTED SUSPENSION: Performed by: INTERNAL MEDICINE

## 2023-05-10 PROCEDURE — B2111ZZ FLUOROSCOPY OF MULTIPLE CORONARY ARTERIES USING LOW OSMOLAR CONTRAST: ICD-10-PCS | Performed by: INTERNAL MEDICINE

## 2023-05-10 PROCEDURE — B2151ZZ FLUOROSCOPY OF LEFT HEART USING LOW OSMOLAR CONTRAST: ICD-10-PCS | Performed by: INTERNAL MEDICINE

## 2023-05-10 PROCEDURE — 25010000002 LORAZEPAM PER 2 MG: Performed by: INTERNAL MEDICINE

## 2023-05-10 PROCEDURE — B2131ZZ FLUOROSCOPY OF MULTIPLE CORONARY ARTERY BYPASS GRAFTS USING LOW OSMOLAR CONTRAST: ICD-10-PCS | Performed by: INTERNAL MEDICINE

## 2023-05-10 PROCEDURE — 25510000001 IOPAMIDOL PER 1 ML: Performed by: INTERNAL MEDICINE

## 2023-05-10 PROCEDURE — B2181ZZ FLUOROSCOPY OF LEFT INTERNAL MAMMARY BYPASS GRAFT USING LOW OSMOLAR CONTRAST: ICD-10-PCS | Performed by: INTERNAL MEDICINE

## 2023-05-10 PROCEDURE — C1769 GUIDE WIRE: HCPCS | Performed by: INTERNAL MEDICINE

## 2023-05-10 PROCEDURE — 82948 REAGENT STRIP/BLOOD GLUCOSE: CPT

## 2023-05-10 RX ORDER — BISACODYL 5 MG/1
5 TABLET, DELAYED RELEASE ORAL DAILY PRN
Status: DISCONTINUED | OUTPATIENT
Start: 2023-05-10 | End: 2023-05-11 | Stop reason: HOSPADM

## 2023-05-10 RX ORDER — MIDAZOLAM HYDROCHLORIDE 1 MG/ML
INJECTION INTRAMUSCULAR; INTRAVENOUS
Status: DISCONTINUED | OUTPATIENT
Start: 2023-05-10 | End: 2023-05-10 | Stop reason: HOSPADM

## 2023-05-10 RX ORDER — HYDROCODONE BITARTRATE AND ACETAMINOPHEN 10; 325 MG/1; MG/1
1 TABLET ORAL EVERY 8 HOURS PRN
Status: DISCONTINUED | OUTPATIENT
Start: 2023-05-10 | End: 2023-05-11 | Stop reason: HOSPADM

## 2023-05-10 RX ORDER — BISACODYL 10 MG
10 SUPPOSITORY, RECTAL RECTAL DAILY PRN
Status: DISCONTINUED | OUTPATIENT
Start: 2023-05-10 | End: 2023-05-11 | Stop reason: HOSPADM

## 2023-05-10 RX ORDER — LIDOCAINE HYDROCHLORIDE 10 MG/ML
INJECTION, SOLUTION EPIDURAL; INFILTRATION; INTRACAUDAL; PERINEURAL
Status: DISCONTINUED | OUTPATIENT
Start: 2023-05-10 | End: 2023-05-10 | Stop reason: HOSPADM

## 2023-05-10 RX ORDER — ATORVASTATIN CALCIUM 40 MG/1
80 TABLET, FILM COATED ORAL NIGHTLY
Status: DISCONTINUED | OUTPATIENT
Start: 2023-05-10 | End: 2023-05-11 | Stop reason: HOSPADM

## 2023-05-10 RX ORDER — INSULIN LISPRO 100 [IU]/ML
0-14 INJECTION, SOLUTION INTRAVENOUS; SUBCUTANEOUS
Status: DISCONTINUED | OUTPATIENT
Start: 2023-05-11 | End: 2023-05-11 | Stop reason: HOSPADM

## 2023-05-10 RX ORDER — ACETAMINOPHEN 325 MG/1
650 TABLET ORAL EVERY 4 HOURS PRN
Status: DISCONTINUED | OUTPATIENT
Start: 2023-05-10 | End: 2023-05-11 | Stop reason: HOSPADM

## 2023-05-10 RX ORDER — FENTANYL CITRATE 50 UG/ML
INJECTION, SOLUTION INTRAMUSCULAR; INTRAVENOUS
Status: DISCONTINUED | OUTPATIENT
Start: 2023-05-10 | End: 2023-05-10 | Stop reason: HOSPADM

## 2023-05-10 RX ORDER — AMOXICILLIN 250 MG
2 CAPSULE ORAL 2 TIMES DAILY
Status: DISCONTINUED | OUTPATIENT
Start: 2023-05-10 | End: 2023-05-11 | Stop reason: HOSPADM

## 2023-05-10 RX ORDER — IBUPROFEN 600 MG/1
1 TABLET ORAL
Status: DISCONTINUED | OUTPATIENT
Start: 2023-05-10 | End: 2023-05-11 | Stop reason: HOSPADM

## 2023-05-10 RX ORDER — DEXTROSE MONOHYDRATE 25 G/50ML
25 INJECTION, SOLUTION INTRAVENOUS
Status: DISCONTINUED | OUTPATIENT
Start: 2023-05-10 | End: 2023-05-11 | Stop reason: HOSPADM

## 2023-05-10 RX ORDER — LORAZEPAM 2 MG/ML
0.5 INJECTION INTRAMUSCULAR EVERY 6 HOURS PRN
Status: DISCONTINUED | OUTPATIENT
Start: 2023-05-10 | End: 2023-05-11 | Stop reason: HOSPADM

## 2023-05-10 RX ORDER — POLYETHYLENE GLYCOL 3350 17 G/17G
17 POWDER, FOR SOLUTION ORAL DAILY PRN
Status: DISCONTINUED | OUTPATIENT
Start: 2023-05-10 | End: 2023-05-11 | Stop reason: HOSPADM

## 2023-05-10 RX ORDER — NICOTINE POLACRILEX 4 MG
15 LOZENGE BUCCAL
Status: DISCONTINUED | OUTPATIENT
Start: 2023-05-10 | End: 2023-05-11 | Stop reason: HOSPADM

## 2023-05-10 RX ADMIN — GABAPENTIN 800 MG: 400 CAPSULE ORAL at 08:38

## 2023-05-10 RX ADMIN — LEVOTHYROXINE SODIUM 75 MCG: 75 TABLET ORAL at 05:05

## 2023-05-10 RX ADMIN — LISINOPRIL 2.5 MG: 2.5 TABLET ORAL at 08:39

## 2023-05-10 RX ADMIN — CARVEDILOL 3.12 MG: 3.12 TABLET, FILM COATED ORAL at 19:05

## 2023-05-10 RX ADMIN — GABAPENTIN 800 MG: 400 CAPSULE ORAL at 13:21

## 2023-05-10 RX ADMIN — SULFUR HEXAFLUORIDE 4 ML: KIT at 10:35

## 2023-05-10 RX ADMIN — ATORVASTATIN CALCIUM 80 MG: 40 TABLET, FILM COATED ORAL at 20:43

## 2023-05-10 RX ADMIN — GABAPENTIN 800 MG: 400 CAPSULE ORAL at 20:43

## 2023-05-10 RX ADMIN — Medication 10 ML: at 08:37

## 2023-05-10 RX ADMIN — CARVEDILOL 3.12 MG: 3.12 TABLET, FILM COATED ORAL at 08:38

## 2023-05-10 RX ADMIN — Medication 10 ML: at 20:43

## 2023-05-10 RX ADMIN — LORAZEPAM 0.5 MG: 0.5 TABLET ORAL at 08:50

## 2023-05-10 RX ADMIN — HYDROCORTISONE 1 APPLICATION: 1 CREAM TOPICAL at 13:21

## 2023-05-10 RX ADMIN — LORAZEPAM 0.5 MG: 2 INJECTION INTRAMUSCULAR; INTRAVENOUS at 16:58

## 2023-05-10 RX ADMIN — ASPIRIN 81 MG: 81 TABLET, COATED ORAL at 08:39

## 2023-05-10 RX ADMIN — CLOPIDOGREL BISULFATE 75 MG: 75 TABLET ORAL at 08:39

## 2023-05-10 RX ADMIN — PANTOPRAZOLE SODIUM 40 MG: 40 TABLET, DELAYED RELEASE ORAL at 05:05

## 2023-05-10 RX ADMIN — HYDROCODONE BITARTRATE AND ACETAMINOPHEN 1 TABLET: 10; 325 TABLET ORAL at 16:23

## 2023-05-10 RX ADMIN — EMPAGLIFLOZIN 25 MG: 25 TABLET, FILM COATED ORAL at 08:39

## 2023-05-10 NOTE — PRE-PROCEDURE NOTE
Brief preprocedure note    The risks, benefits, and alternative options of a cardiac catheterization with possible percutaneous coronary mention were discussed with the patient.  The patient is agreeable and the plan is to proceed with cardiac catheterization and possible PCI.     Left radial Barbeau type A but pulse non palpable. Discussed with patient that we will ultrasound the wrist to see if it is amenable to repeat heart cath.  If not, we will proceed with femoral approach.  2+ right femoral pulse.  Preprocedural normal saline ordered    Sang Eason MD, MSc, FACC, [facility]  Interventional Cardiology  [facility]

## 2023-05-10 NOTE — PLAN OF CARE
Goal Outcome Evaluation:              Outcome Evaluation: VSS. On room air during the day but 2L O2 at hs.  Norco and ativan given at bedtime.  Patient to have a left heart cath at 10 am with Dr. Eason.  Patient has been NPO since 0030. No complaints of chest pain this shift.  Will continue to follow plan of care.

## 2023-05-10 NOTE — PROGRESS NOTES
[facility] Medicine Services  PROGRESS NOTE    Patient Name: Remington Alvarado  : 1945  MRN: [id number]    Date of Admission: 2023  Primary Care Physician: Disha Velez DO    Subjective   Subjective     CC:  Chest pain    HPI:  Awaiting LHC, no chest pain or soa. No complaints. Eager to go home.     ROS:  Gen- No fevers, chills  CV- No chest pain, palpitations  Resp- No cough, dyspnea  GI- No N/V/D, abd pain       Objective   Objective     Vital Signs:   Temp:  [97.9 °F (36.6 °C)-98.5 °F (36.9 °C)] 98.3 °F (36.8 °C)  Heart Rate:  [66-81] 72  Resp:  [16-20] 16  BP: ()/(60-73) 127/71  Flow (L/min):  [2] 2     Physical Exam:  Constitutional: No acute distress, awake, alert  HENT: NCAT, mucous membranes moist  Respiratory: Clear to auscultation bilaterally, respiratory effort normal   Cardiovascular: RRR, no murmurs, rubs, or gallops  Gastrointestinal: Positive bowel sounds, soft, nontender, nondistended  Musculoskeletal: No bilateral ankle edema  Psychiatric: Appropriate affect, cooperative  Neurologic: Oriented x 3, strength symmetric in all extremities, Cranial Nerves grossly intact to confrontation, speech clear  Skin: No rashes             Results Reviewed:  LAB RESULTS:      Lab 23  0601 23  1338   WBC 4.01 4.45   HEMOGLOBIN 12.8* 13.0   HEMATOCRIT 42.7 42.3   PLATELETS 176 186   NEUTROS ABS 2.10 3.06   IMMATURE GRANS (ABS) 0.01 0.01   LYMPHS ABS 1.13 0.73   MONOS ABS 0.64 0.55   EOS ABS 0.11 0.08   MCV 90.3 90.8   PROTIME 13.8  --    APTT 31.8  --          Lab 23  0601 23  1338   SODIUM 136 137   POTASSIUM 4.6 5.0   CHLORIDE 99 102   CO2 23.0 19.0*   ANION GAP 14.0 16.0*   BUN 16 16   CREATININE 1.01 0.99   EGFR 77.6 79.4   GLUCOSE 121* 133*   CALCIUM 9.5 9.4   MAGNESIUM 2.2  --    TSH 3.360  --          Lab 23  1338   TOTAL PROTEIN 7.3   ALBUMIN 4.4   GLOBULIN 2.9   ALT (SGPT) 12   AST (SGOT) 17   BILIRUBIN 0.9   ALK PHOS 56    LIPASE 19         Lab 05/09/23  0601 05/08/23  2142 05/08/23  1653 05/08/23  1338   PROBNP  --   --   --  941.9   HSTROP T  --  34* 32* 26*   PROTIME 13.8  --   --   --    INR 1.05  --   --   --          Lab 05/09/23  0601   CHOLESTEROL 99   LDL CHOL 21   HDL CHOL 27*   TRIGLYCERIDES 360*             Brief Urine Lab Results  (Last result in the past 365 days)      Color   Clarity   Blood   Leuk Est   Nitrite   Protein   CREAT   Urine HCG        05/08/23 2301 Yellow   Clear   Trace   Negative   Negative   Negative                 Microbiology Results Abnormal     None          Adult Transthoracic Echo Complete w/ Color, Spectral and Contrast if Necessary Per Protocol    Result Date: 5/10/2023  •  Left ventricular ejection fraction appears to be 31 - 35%. •  There is calcification of the aortic valve. •  Anterior hypokinesis •  MAC     XR Chest 1 View    Result Date: 5/10/2023  XR CHEST 1 VW COMPARISON: CT chest on 5/8/2023 HISTORY: SOA, CHF FINDINGS: Technical adequacy: Adequate Central airways: Unremarkable Heart: Within normal limits. There is abnormal bulge to the contour of the left heart border. 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 Left subclavian route AICD. Status post median sternotomy. Surgical staples in the right upper chest likely representing prior surgery. Great vessels: Atherosclerotic aorta Mediastinum: Unremarkable for the technique. Lungs: No large focal infiltrates. There are no convincing signs of significant pulmonary edema. No large nodules. Pulmonary fernie:Unremarkable Pleura: Unremarkable Skeletal structures: Degenerative changes Extrathoracic soft tissues:Unremarkable Additional comments: None     Impression: Impression: No convincing signs of pulmonary edema on this exam. Electronically Signed: John Bautista  5/10/2023 11:36 AM EDT  Workstation ID: [id number]    CT Angiogram Chest    Result Date: 5/8/2023  CT ANGIOGRAM CHEST Date of Exam: 5/8/2023 4:08 PM EDT  Indication: CP, SOB. Comparison: None available. Technique: CTA of the chest was performed after the uneventful intravenous administration of 93 mL Isovue-370. Reconstructed coronal and sagittal images were also obtained. In addition, a 3-D volume rendered image was created for interpretation. Automated exposure control and iterative reconstruction methods were used. Findings: There is excellent pulmonary arterial opacification and there are no filling defects to suggest pulmonary embolic disease. There is severe native coronary artery calcific atherosclerosis. There is calcification along the anterior left ventricular free wall likely from previous infarct. There is mild bilateral basilar atelectasis, right greater than left. There is no pathologic adenopathy. 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 There are clips from cholecystectomy.     Impression: Impression: No evidence of pulmonary embolic disease. No active disease in the chest. Electronically Signed: Josesito Palomino  5/8/2023 4:37 PM EDT  Workstation ID: [id number]    Stress Test With Pet Myocardial Perfusion    Result Date: 5/9/2023  •  Patient denied any chest discomfort/pain during stress; did report a developing headache after the Lexiscan was given. •  Inverted T wave in inferolateral leads, after the Lexiscan was given.  No significant ST changes identified. •  REST EF = 31% STRESS EF =  34%. •  Dilated left ventricle •  Large anterior apical defect--mixed •  Intermediate risk study with both scar and ischemia noted in the anterior wall •  Clinical correlation is required       Results for orders placed during the hospital encounter of 05/08/23    Adult Transthoracic Echo Complete w/ Color, Spectral and Contrast if Necessary Per Protocol    Interpretation Summary  •  Left ventricular ejection fraction appears to be 31 - 35%.  •  There is calcification of the aortic valve.  •  Anterior  hypokinesis  •  MAC      Current medications:  Scheduled Meds:aspirin, 81 mg, Oral, Daily  atorvastatin, 80 mg, Oral, Nightly  carvedilol, 3.125 mg, Oral, BID With Meals  clopidogrel, 75 mg, Oral, Daily  empagliflozin, 25 mg, Oral, Daily  enoxaparin, 40 mg, Subcutaneous, Daily  gabapentin, 800 mg, Oral, 4x Daily  insulin lispro, 0-14 Units, Subcutaneous, 4x Daily With Meals & Nightly  levothyroxine, 75 mcg, Oral, Q AM  lisinopril, 2.5 mg, Oral, Daily  pantoprazole, 40 mg, Oral, Q AM  sodium chloride, 10 mL, Intravenous, Q12H      Continuous Infusions:sodium chloride, 50 mL/hr, Last Rate: 50 mL/hr (05/10/23 0618)      PRN Meds:.•  acetaminophen **OR** acetaminophen **OR** acetaminophen  •  dextrose  •  dextrose  •  glucagon (human recombinant)  •  HYDROcodone-acetaminophen  •  hydrocortisone  •  LORazepam  •  LORazepam  •  melatonin  •  ondansetron **OR** ondansetron  •  sodium chloride  •  sodium chloride    Assessment & Plan   Assessment & Plan     Active Hospital Problems    Diagnosis  POA   • **Unstable angina [I20.0]  Yes   • GERD without esophagitis [K21.9]  Yes   • Peripheral neuropathy [G62.9]  Yes   • Shortness of breath [R06.02]  Yes   • Type 2 diabetes mellitus with diabetic polyneuropathy, with long-term current use of insulin [E11.42, Z79.4]  Not Applicable   • Acquired hypothyroidism [E03.9]  Yes   • Mixed hyperlipidemia [E78.2]  Yes      Resolved Hospital Problems   No resolved problems to display.        Brief Hospital Course to date:  Remington Alvarado is a 77 y.o. male w/ hx CAD (s/p cabg x 2, multiple stens), htn, hl, dm2, gerd, hypothyroidism who prsented w/ [facility] ED w/ intermittent chest pressure and dyspnea. Ct angio chest negative for PE. Cardiology consulted. PET stress ordered 5/9/23 which was indetermediate risk study w/ resting ef 31%, stress EF 34%.       Chest pain/Possible unstable angina  Abnormal stress test (indeterminant risk PET stress)  Hx CAD (multiple previous stents & previous CABG  x 2)  HFrEF (ef 31% at rest during PET stress)  HTN  HL  -cta chest negative  -continue asa, plavix, coreg, lisinopril, statin, jardiance  -cards (Dr. Carmona) following: planning [facility] 5/10/23-- still waiting   --likely dc in am    DM2  Peripheral neuropathy  a1c 6.5 on 4/13/23  -continue jardiance, sliding scale humalog, titrate prn    Gerd  -ppi    Hypothyroidism  -tsh ok; continue synthroid      Expected Discharge Location and Transportation: home  Expected Discharge ? 5/11/23  Expected Discharge Date: 5/11/2023; Expected Discharge Time:      DVT prophylaxis:  Medical DVT prophylaxis orders are present.     AM-PAC 6 Clicks Score (PT): 24 (05/10/23 0800)    CODE STATUS:   Code Status and Medical Interventions:   Ordered at: 05/08/23 2000     Code Status (Patient has no pulse and is not breathing):    CPR (Attempt to Resuscitate)     Medical Interventions (Patient has pulse or is breathing):    Full Support       Shanika Smith, APRN  05/10/23

## 2023-05-10 NOTE — PROGRESS NOTES
Remington Alvarado  1945  S224/1        Patient underwent nuclear MPS yesterday which shows both scare and ischemia of the anterior wall.  EF 31-34%.  He is currently NPO for LHC +/- PCI today.  This will be performed by Dr. Eason.  Risks and benefits have been reviewed with patient and he is willing to proceed.  Further recommendations based on outcomes.    Vitals:    05/10/23 0409   BP: 104/60   Pulse: 69   Resp: 20   Temp: 98.1 °F (36.7 °C)   SpO2: 95%     No new labs.    NSR on tele.    Yoon Santamaria PA-C  Electronically signed by SCOTT Dixon, 05/10/23, 7:51 AM EDT.

## 2023-05-10 NOTE — CASE MANAGEMENT/SOCIAL WORK
Continued Stay Note  [facility]     Patient Name: Remington Alvarado  MRN: [id number]  Today's Date: 5/10/2023    Admit Date: 5/8/2023    Plan: home   Discharge Plan     Row Name 05/10/23 7180       Plan    Plan home    Patient/Family in Agreement with Plan yes    Plan Comments Mr. Alvarado scheduled for heart cath today.  CM will continue to follow for discharge planning and needs.    Final Discharge Disposition Code 01 - home or self-care               Discharge Codes    No documentation.               Expected Discharge Date and Time     Expected Discharge Date Expected Discharge Time    May 10, 2023             Clau Pedersen RN

## 2023-05-11 ENCOUNTER — READMISSION MANAGEMENT (OUTPATIENT)
Dept: CALL CENTER | Facility: HOSPITAL | Age: 78
End: 2023-05-11
Payer: MEDICARE

## 2023-05-11 VITALS
OXYGEN SATURATION: 94 % | WEIGHT: 195.77 LBS | HEIGHT: 71 IN | HEART RATE: 74 BPM | DIASTOLIC BLOOD PRESSURE: 86 MMHG | RESPIRATION RATE: 17 BRPM | TEMPERATURE: 98.3 F | SYSTOLIC BLOOD PRESSURE: 119 MMHG | BODY MASS INDEX: 27.41 KG/M2

## 2023-05-11 PROBLEM — I50.22 CHRONIC SYSTOLIC HEART FAILURE: Status: ACTIVE | Noted: 2023-05-11

## 2023-05-11 LAB
GLUCOSE BLDC GLUCOMTR-MCNC: 167 MG/DL (ref 70–130)
GLUCOSE BLDC GLUCOMTR-MCNC: 182 MG/DL (ref 70–130)

## 2023-05-11 PROCEDURE — 82948 REAGENT STRIP/BLOOD GLUCOSE: CPT

## 2023-05-11 PROCEDURE — 63710000001 INSULIN LISPRO (HUMAN) PER 5 UNITS: Performed by: NURSE PRACTITIONER

## 2023-05-11 PROCEDURE — 25010000002 ENOXAPARIN PER 10 MG: Performed by: INTERNAL MEDICINE

## 2023-05-11 RX ADMIN — PANTOPRAZOLE SODIUM 40 MG: 40 TABLET, DELAYED RELEASE ORAL at 05:05

## 2023-05-11 RX ADMIN — ASPIRIN 81 MG: 81 TABLET, COATED ORAL at 08:45

## 2023-05-11 RX ADMIN — HYDROCODONE BITARTRATE AND ACETAMINOPHEN 1 TABLET: 10; 325 TABLET ORAL at 00:11

## 2023-05-11 RX ADMIN — LEVOTHYROXINE SODIUM 75 MCG: 75 TABLET ORAL at 05:05

## 2023-05-11 RX ADMIN — GABAPENTIN 800 MG: 400 CAPSULE ORAL at 08:44

## 2023-05-11 RX ADMIN — INSULIN LISPRO 3 UNITS: 100 INJECTION, SOLUTION INTRAVENOUS; SUBCUTANEOUS at 12:15

## 2023-05-11 RX ADMIN — INSULIN LISPRO 3 UNITS: 100 INJECTION, SOLUTION INTRAVENOUS; SUBCUTANEOUS at 08:44

## 2023-05-11 RX ADMIN — GABAPENTIN 800 MG: 400 CAPSULE ORAL at 12:15

## 2023-05-11 RX ADMIN — HYDROCODONE BITARTRATE AND ACETAMINOPHEN 1 TABLET: 10; 325 TABLET ORAL at 10:42

## 2023-05-11 RX ADMIN — LORAZEPAM 0.5 MG: 0.5 TABLET ORAL at 00:11

## 2023-05-11 RX ADMIN — CLOPIDOGREL BISULFATE 75 MG: 75 TABLET ORAL at 08:45

## 2023-05-11 RX ADMIN — Medication 10 ML: at 08:44

## 2023-05-11 RX ADMIN — ENOXAPARIN SODIUM 40 MG: 40 INJECTION SUBCUTANEOUS at 08:44

## 2023-05-11 RX ADMIN — CARVEDILOL 3.12 MG: 3.12 TABLET, FILM COATED ORAL at 08:45

## 2023-05-11 RX ADMIN — SENNOSIDES AND DOCUSATE SODIUM 2 TABLET: 50; 8.6 TABLET ORAL at 08:45

## 2023-05-11 RX ADMIN — EMPAGLIFLOZIN 25 MG: 25 TABLET, FILM COATED ORAL at 08:45

## 2023-05-11 NOTE — DISCHARGE SUMMARY
[facility] Medicine Services  DISCHARGE SUMMARY    Patient Name: Remington Alvarado  : 1945  MRN: [id number]    Date of Admission: 2023  5:52 PM  Date of Discharge:  2023  Primary Care Physician: Disha Velez,     Consults     Date and Time Order Name Status Description    2023 12:34 AM Inpatient Cardiology Consult Completed           Hospital Course     Presenting Problem:   Unstable angina [I20.0]    Active Hospital Problems    Diagnosis  POA   • **Unstable angina [I20.0]  Yes   • Chronic systolic heart failure (CMS/HCC) [I50.22]  Unknown   • GERD without esophagitis [K21.9]  Yes   • Peripheral neuropathy [G62.9]  Yes   • Shortness of breath [R06.02]  Yes   • Type 2 diabetes mellitus with diabetic polyneuropathy, with long-term current use of insulin [E11.42, Z79.4]  Not Applicable   • Acquired hypothyroidism [E03.9]  Yes   • Mixed hyperlipidemia [E78.2]  Yes      Resolved Hospital Problems   No resolved problems to display.          Hospital Course:  Remington Alvarado is a 77 y.o. male w/ hx CAD (s/p cabg x 2, multiple stens), htn, hl, dm2, gerd, hypothyroidism who prsented w/ [facility] ED w/ intermittent chest pressure and dyspnea. Ct angio chest negative for PE. Cardiology consulted. PET stress ordered 23 which was indetermediate risk study w/ resting ef 31%, stress EF 34%.     -S/P LHC, no PCI 5/10/23  -Continue GDMT which includes DAPT, high intensity statin, Coreg, Jardiance.  DC ACE inhibitor, initiate Entresto.  -follow labs as outpt.  -  Consider upgrade of device to BiV ICE.  Echo repeated:  EF < 35% with MAC, anterior HK, calcification of aortic valve.      Discharge Follow Up Recommendations for outpatient labs/diagnostics:   PCP 1 week   Cardiology, Dr Carmona 1 month     Day of Discharge     HPI:   No chest pain. Eager to go home. No new needs. No f/c, n/v/d, soa or cp     Review of Systems   All other systems negative    Vital Signs:   Temp:   [97.7 °F (36.5 °C)-98.6 °F (37 °C)] 98.4 °F (36.9 °C)  Heart Rate:  [67-82] 78  Resp:  [16-18] 16  BP: (111-165)/(63-86) 134/74  Flow (L/min):  [2] 2      Physical Exam:  Constitutional: No acute distress, awake, alert  HENT: NCAT, mucous membranes moist  Respiratory: Clear to auscultation bilaterally, respiratory effort normal   Cardiovascular: RRR, no murmurs, rubs, or gallops  Gastrointestinal: Positive bowel sounds, soft, nontender, nondistended  Musculoskeletal: No bilateral ankle edema  Psychiatric: Appropriate affect, cooperative  Neurologic: Oriented x 3, strength symmetric in all extremities, Cranial Nerves grossly intact to confrontation, speech clear  Skin: No rashes, right inguinal puncture site CDI ISELA/ soft    Pertinent  and/or Most Recent Results     LAB RESULTS:      Lab 05/09/23  0601 05/08/23  1338   WBC 4.01 4.45   HEMOGLOBIN 12.8* 13.0   HEMATOCRIT 42.7 42.3   PLATELETS 176 186   NEUTROS ABS 2.10 3.06   IMMATURE GRANS (ABS) 0.01 0.01   LYMPHS ABS 1.13 0.73   MONOS ABS 0.64 0.55   EOS ABS 0.11 0.08   MCV 90.3 90.8   PROTIME 13.8  --    APTT 31.8  --          Lab 05/09/23  0601 05/08/23  1338   SODIUM 136 137   POTASSIUM 4.6 5.0   CHLORIDE 99 102   CO2 23.0 19.0*   ANION GAP 14.0 16.0*   BUN 16 16   CREATININE 1.01 0.99   EGFR 77.6 79.4   GLUCOSE 121* 133*   CALCIUM 9.5 9.4   MAGNESIUM 2.2  --    TSH 3.360  --          Lab 05/08/23  1338   TOTAL PROTEIN 7.3   ALBUMIN 4.4   GLOBULIN 2.9   ALT (SGPT) 12   AST (SGOT) 17   BILIRUBIN 0.9   ALK PHOS 56   LIPASE 19         Lab 05/09/23  0601 05/08/23  2142 05/08/23  1653 05/08/23  1338   PROBNP  --   --   --  941.9   HSTROP T  --  34* 32* 26*   PROTIME 13.8  --   --   --    INR 1.05  --   --   --          Lab 05/09/23  0601   CHOLESTEROL 99   LDL CHOL 21   HDL CHOL 27*   TRIGLYCERIDES 360*             Brief Urine Lab Results  (Last result in the past 365 days)      Color   Clarity   Blood   Leuk Est   Nitrite   Protein   CREAT   Urine HCG        05/08/23  2301 Yellow   Clear   Trace   Negative   Negative   Negative               Microbiology Results (last 10 days)     ** No results found for the last 240 hours. **          Adult Transthoracic Echo Complete w/ Color, Spectral and Contrast if Necessary Per Protocol    Result Date: 5/10/2023  •  Left ventricular ejection fraction appears to be 31 - 35%. •  There is calcification of the aortic valve. •  Anterior hypokinesis •  MAC     Cardiac Catheterization/Vascular Study    Result Date: 5/10/2023  •  Patent LIMA to LAD, SVG to ramus, and SVG to RCA.  The left main and mid RCA are known to be occluded. •  Mildly elevated LVEDP of 16 mmHg.     XR Chest 1 View    Result Date: 5/10/2023  XR CHEST 1 VW COMPARISON: CT chest on 5/8/2023 HISTORY: SOA, CHF FINDINGS: Technical adequacy: Adequate Central airways: Unremarkable Heart: Within normal limits. There is abnormal bulge to the contour of the left heart border. 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 Left subclavian route AICD. Status post median sternotomy. Surgical staples in the right upper chest likely representing prior surgery. Great vessels: Atherosclerotic aorta Mediastinum: Unremarkable for the technique. Lungs: No large focal infiltrates. There are no convincing signs of significant pulmonary edema. No large nodules. Pulmonary fernie:Unremarkable Pleura: Unremarkable Skeletal structures: Degenerative changes Extrathoracic soft tissues:Unremarkable Additional comments: None     Impression: No convincing signs of pulmonary edema on this exam. Electronically Signed: John Bautista  5/10/2023 11:36 AM EDT  Workstation ID: [id number]    CT Angiogram Chest    Result Date: 5/8/2023  CT ANGIOGRAM CHEST Date of Exam: 5/8/2023 4:08 PM EDT Indication: CP, SOB. Comparison: None available. Technique: CTA of the chest was performed after the uneventful intravenous administration of 93 mL Isovue-370. Reconstructed coronal and sagittal images were also obtained.  In addition, a 3-D volume rendered image was created for interpretation. Automated exposure control and iterative reconstruction methods were used. Findings: There is excellent pulmonary arterial opacification and there are no filling defects to suggest pulmonary embolic disease. There is severe native coronary artery calcific atherosclerosis. There is calcification along the anterior left ventricular free wall likely from previous infarct. There is mild bilateral basilar atelectasis, right greater than left. There is no pathologic adenopathy. 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 There are clips from cholecystectomy.     Impression: No evidence of pulmonary embolic disease. No active disease in the chest. Electronically Signed: Josesito Palomino  5/8/2023 4:37 PM EDT  Workstation ID: [id number]    Stress Test With Pet Myocardial Perfusion    Result Date: 5/9/2023  •  Patient denied any chest discomfort/pain during stress; did report a developing headache after the Lexiscan was given. •  Inverted T wave in inferolateral leads, after the Lexiscan was given.  No significant ST changes identified. •  REST EF = 31% STRESS EF =  34%. •  Dilated left ventricle •  Large anterior apical defect--mixed •  Intermediate risk study with both scar and ischemia noted in the anterior wall •  Clinical correlation is required               Results for orders placed during the hospital encounter of 05/08/23    Adult Transthoracic Echo Complete w/ Color, Spectral and Contrast if Necessary Per Protocol    Interpretation Summary  •  Left ventricular ejection fraction appears to be 31 - 35%.  •  There is calcification of the aortic valve.  •  Anterior hypokinesis  •  MAC      Plan for Follow-up of Pending Labs/Results:     Discharge Details        Discharge Medications      New Medications      Instructions Start Date   sacubitril-valsartan 24-26 MG tablet  Commonly known as:  ENTRESTO   1 tablet, Oral, Every 12 Hours Scheduled         Continue These Medications      Instructions Start Date   aspirin 81 MG EC tablet   81 mg, Oral, Daily      atorvastatin 80 MG tablet  Commonly known as: LIPITOR   80 mg, Oral, Nightly      carvedilol 3.125 MG tablet  Commonly known as: COREG   3.125 mg, Oral, 2 Times Daily With Meals      clopidogrel 75 MG tablet  Commonly known as: PLAVIX   75 mg, Oral, Daily      empagliflozin 25 MG tablet tablet  Commonly known as: Jardiance   25 mg, Oral, Daily      ezetimibe 10 MG tablet  Commonly known as: ZETIA   10 mg, Oral, Daily      gabapentin 800 MG tablet  Commonly known as: NEURONTIN   800 mg, Oral, 4 Times Daily      HYDROcodone-acetaminophen  MG per tablet  Commonly known as: NORCO   1 tablet, Oral, 3 Times Daily      insulin NPH-insulin regular (70-30) 100 UNIT/ML injection  Commonly known as: humuLIN 70/30,novoLIN 70/30   62 Units, Subcutaneous, 2 Times Daily With Meals      isosorbide mononitrate 60 MG 24 hr tablet  Commonly known as: IMDUR   60 mg, Oral, Daily      levothyroxine 75 MCG tablet  Commonly known as: SYNTHROID, LEVOTHROID   75 mcg, Oral, Daily      metFORMIN 1000 MG tablet  Commonly known as: GLUCOPHAGE   1,000 mg, Oral, 2 Times Daily With Meals      nitroglycerin 0.4 MG SL tablet  Commonly known as: NITROSTAT   0.4 mg, Sublingual, Every 5 Minutes PRN      omeprazole 40 MG capsule  Commonly known as: priLOSEC   40 mg, Oral, Daily      potassium chloride 20 MEQ CR tablet  Commonly known as: K-DUR,KLOR-CON   20 mEq, Oral, Daily      ranolazine 1000 MG 12 hr tablet  Commonly known as: RANEXA   1,000 mg, Oral, 2 Times Daily         Stop These Medications    lisinopril 5 MG tablet  Commonly known as: PRINIVIL,ZESTRIL     zolpidem 5 MG tablet  Commonly known as: AMBIEN            Allergies   Allergen Reactions   • Sulfa Antibiotics          Discharge Disposition:  Home or Self Care    Diet:  Hospital:  Diet Order   Procedures   • Diet:  Regular/House Diet, Cardiac Diets; Healthy Heart (2-3 Na+); Texture: Regular Texture (IDDSI 7); Fluid Consistency: Thin (IDDSI 0)            CODE STATUS:    Code Status and Medical Interventions:   Ordered at: 05/10/23 1827     Level Of Support Discussed With:    Patient     Code Status (Patient has no pulse and is not breathing):    CPR (Attempt to Resuscitate)     Medical Interventions (Patient has pulse or is breathing):    Full       Future Appointments   Date Time Provider Department Center   10/16/2023  8:00 AM Cira Baca PA [facility] Smith, DAISY  05/11/23      Time Spent on Discharge:  I spent  40minutes on this discharge activity which included: face-to-face encounter with the patient, reviewing the data in the system, coordination of the care with the nursing staff as well as consultants, documentation, and entering orders.

## 2023-05-11 NOTE — CASE MANAGEMENT/SOCIAL WORK
Case Management Discharge Note      Final Note: Patient has orders for discharge.  Spoke with patient at bedside regarding discharge plan, patient is fully dressed and has already removed his heart monitor, wants to be taken downstairs and be left to wait for his ride.  RN attempting to explain why he needs the heart monitor on and will need to wait until the paperwork is completed.  Patient denies discharge needs and has a friend coming to transport.  Patient plan is to discharge home today via car with a friend to transport.         Selected Continued Care - Admitted Since 5/8/2023     Destination    No services have been selected for the patient.              Durable Medical Equipment    No services have been selected for the patient.              Dialysis/Infusion    No services have been selected for the patient.              Home Medical Care    No services have been selected for the patient.              Therapy    No services have been selected for the patient.              Community Resources    No services have been selected for the patient.              Community & DME    No services have been selected for the patient.                       Final Discharge Disposition Code: 01 - home or self-care

## 2023-05-11 NOTE — PLAN OF CARE
Goal Outcome Evaluation:  Plan of Care Reviewed With: patient           Outcome Evaluation: VSS on room air with sats >90%. Right groin site with minimal drainage on gauze present at beginning of shift, has not grown and is soft. Patient up with standby assist to bathroom after bedrest was finished. PRN Noro and Ativan given at bedtime per patient request. No other complaints from patient.

## 2023-05-11 NOTE — PROGRESS NOTES
" [facility] - Progress Note    Remington Alvarado  1945  [id number]/1    05/11/23, 07:58 EDT      Chief Complaint: Following for CAD, HFrEF    Subjective:   S/P LHC yesterday, no PCI.    Awake in bed.  Feeling good.  Wants to go home.    Vitals stable, maintaining NSR          Review of Systems:  Pertinent positives are listed above and in physical exam.  All others have been reviewed and are negative.    aspirin, 81 mg, Oral, Daily  atorvastatin, 80 mg, Oral, Nightly  carvedilol, 3.125 mg, Oral, BID With Meals  clopidogrel, 75 mg, Oral, Daily  empagliflozin, 25 mg, Oral, Daily  enoxaparin, 40 mg, Subcutaneous, Daily  gabapentin, 800 mg, Oral, 4x Daily  insulin lispro, 0-14 Units, Subcutaneous, TID AC  levothyroxine, 75 mcg, Oral, Q AM  pantoprazole, 40 mg, Oral, Q AM  sacubitril-valsartan, 1 tablet, Oral, Q12H  senna-docusate sodium, 2 tablet, Oral, BID  sodium chloride, 10 mL, Intravenous, Q12H        Objective:  Vitals:   height is 179.8 cm (70.79\") and weight is 88.8 kg (195 lb 12.3 oz). His oral temperature is 98.6 °F (37 °C). His blood pressure is 111/63 and his pulse is 73. His respiration is 16 and oxygen saturation is 91%.       Intake/Output Summary (Last 24 hours) at 5/11/2023 0758  Last data filed at 5/10/2023 2200  Gross per 24 hour   Intake 0 ml   Output 400 ml   Net -400 ml       Physical Exam:  General:  WN, NAD, A and O x3.  CV:  Normal S1,S2. No murmur, rub, or gallop.  Resp:  CTA Edin, equal, nonlabored.  Abd:  Soft, + BS, no organomegaly. Nontender to palpation.  Extrem:  No edema BLE, 2+ pedal/PT pulses.    Right groin stable, dressing removed.        Results from last 7 days   Lab Units 05/09/23  0601   WBC 10*3/mm3 4.01   HEMOGLOBIN g/dL 12.8*   HEMATOCRIT % 42.7   PLATELETS 10*3/mm3 176     Results from last 7 days   Lab Units 05/09/23  0601 05/08/23  1338   SODIUM mmol/L 136 137   POTASSIUM mmol/L 4.6 5.0   CHLORIDE mmol/L 99 102   CO2 mmol/L 23.0 19.0*   BUN mg/dL 16 16 "   CREATININE mg/dL 1.01 0.99   CALCIUM mg/dL 9.5 9.4   BILIRUBIN mg/dL  --  0.9   ALK PHOS U/L  --  56   ALT (SGPT) U/L  --  12   AST (SGOT) U/L  --  17   GLUCOSE mg/dL 121* 133*     Results from last 7 days   Lab Units 05/09/23  0601   INR  1.05     Results from last 7 days   Lab Units 05/09/23  0601   TSH uIU/mL 3.360     Results from last 7 days   Lab Units 05/09/23  0601   CHOLESTEROL mg/dL 99   TRIGLYCERIDES mg/dL 360*   HDL CHOL mg/dL 27*   LDL CHOL mg/dL 21     Results from last 7 days   Lab Units 05/08/23  1338   PROBNP pg/mL 941.9       Tele:  NSR    Assessment:  -77-year-old  male with known CAD (CABG x2, multiple stents) presents to [facility] ED with chest pain, exertional dyspnea worrisome for angina.  Admitted to hospitalist service.  No EKG changes, mild elevation in troponin which is stable.  -HTN  -HLP  -DM 2 with significant peripheral neuroapthy  -GERD  -Hypothyroidism  -Anxiety              Plan:  -Admitted to hospitalist service.  -S/P LHC, no PCI.    -Continue GDMT which includes DAPT, high intensity statin, Coreg, Jardiance.  DC ACE inhibitor, initiate Entresto.  -follow labs as outpt.  -  Consider upgrade of device to BiV ICE.  Echo repeated:  EF < 35% with MAC, anterior HK, calcification of aortic valve.  -  Okay for DC Home in stable condition.  Will have pt follow up with Dr. Carmona in 1 month with EKG.    I discussed the patient's findings and my recommendations with the patient, any present family members, and the nursing staff.  Sang Carmona MD saw and examined patient, verified hx and PE, read all radiographic studies, reviewed labs and micro data, and formulated dx, plan for treatment and all medical decision making.      Yoon Santamaria PA-C  05/11/23, 07:58 EDT

## 2023-05-12 NOTE — OUTREACH NOTE
Prep Survey    Flowsheet Row Responses   [facility] discharged from? [place]   Is LACE score < 7 ? No   Eligibility Readm Mgmt   Discharge diagnosis Unstable angina   Does the patient have one of the following disease processes/diagnoses(primary or secondary)? Other   Does the patient have Home health ordered? No   Is there a DME ordered? No   Prep survey completed? Yes          Carmen MONTANEZ - Registered Nurse

## 2023-05-16 ENCOUNTER — READMISSION MANAGEMENT (OUTPATIENT)
Dept: CALL CENTER | Facility: HOSPITAL | Age: 78
End: 2023-05-16
Payer: MEDICARE

## 2023-05-16 NOTE — OUTREACH NOTE
Medical Week 1 Survey    Flowsheet Row Responses   [facility] patient discharged from? [place]   Does the patient have one of the following disease processes/diagnoses(primary or secondary)? Other   Week 1 attempt successful? Yes   Call start time 0909   Call end time 0932   Discharge diagnosis Unstable angina   Meds reviewed with patient/caregiver? Yes   Is the patient having any side effects they believe may be caused by any medication additions or changes? No   Does the patient have all medications ordered at discharge? Yes   Is the patient taking all medications as directed (includes completed medication regime)? Yes   Does the patient have a primary care provider?  Yes   Does the patient have an appointment with their PCP within 7 days of discharge? N/A  [will see specialists]   Has the patient kept scheduled appointments due by today? N/A   Has home health visited the patient within 72 hours of discharge? N/A   Psychosocial issues? No   Did the patient receive a copy of their discharge instructions? Yes   Nursing interventions Reviewed instructions with patient   What is the patient's perception of their health status since discharge? Improving   Is the patient/caregiver able to teach back signs and symptoms related to disease process for when to call PCP? Yes   Is the patient/caregiver able to teach back signs and symptoms related to disease process for when to call 911? Yes   Is the patient/caregiver able to teach back the hierarchy of who to call/visit for symptoms/problems? PCP, Specialist, Home health nurse, Urgent Care, ED, 911 Yes   Additional teach back comments denies chest pain, states feels like heart rate is slower   Week 1 call completed? Yes          Zoe BUCHANAN - Registered Nurse

## 2023-05-24 ENCOUNTER — READMISSION MANAGEMENT (OUTPATIENT)
Dept: CALL CENTER | Facility: HOSPITAL | Age: 78
End: 2023-05-24
Payer: MEDICARE

## 2023-05-24 NOTE — OUTREACH NOTE
Medical Week 2 Survey    Flowsheet Row Responses   [facility] patient discharged from? [place]   Does the patient have one of the following disease processes/diagnoses(primary or secondary)? Other   Week 2 attempt successful? Yes   Call start time 1516   Discharge diagnosis Unstable angina   Call end time 1524   Meds reviewed with patient/caregiver? Yes   Is the patient taking all medications as directed (includes completed medication regime)? Yes   Does the patient have a primary care provider?  Yes   Does the patient have an appointment with their PCP within 7 days of discharge? Yes   Has the patient kept scheduled appointments due by today? Yes   Comments Cardio appt 6/8/23   Has home health visited the patient within 72 hours of discharge? N/A   Psychosocial issues? No   Did the patient receive a copy of their discharge instructions? Yes   Nursing interventions Reviewed instructions with patient   What is the patient's perception of their health status since discharge? Improving   Is the patient/caregiver able to teach back signs and symptoms related to disease process for when to call PCP? Yes   Is the patient/caregiver able to teach back signs and symptoms related to disease process for when to call 911? Yes   Is the patient/caregiver able to teach back the hierarchy of who to call/visit for symptoms/problems? PCP, Specialist, Home health nurse, Urgent Care, ED, 911 Yes   Week 2 Call Completed? Yes   Wrap up additional comments Pt reports he feels fine and is doing well. He states that his BP has dropped down 88/50s however it does come back up with activity. Advised pt to start a log of BP and to let Cardio Dr know if drops down again.          HAYDEE CA - Registered Nurse

## 2023-06-01 ENCOUNTER — READMISSION MANAGEMENT (OUTPATIENT)
Dept: CALL CENTER | Facility: HOSPITAL | Age: 78
End: 2023-06-01

## 2023-06-01 NOTE — OUTREACH NOTE
Medical Week 3 Survey    Flowsheet Row Responses   [facility] patient discharged from? [place]   Does the patient have one of the following disease processes/diagnoses(primary or secondary)? Other   Week 3 attempt successful? Yes   Call start time 1721   Call end time 1728   Discharge diagnosis Unstable angina   Meds reviewed with patient/caregiver? Yes   Is the patient having any side effects they believe may be caused by any medication additions or changes? No   Does the patient have all medications ordered at discharge? Yes   Is the patient taking all medications as directed (includes completed medication regime)? Yes   Does the patient have a primary care provider?  Yes   Does the patient have an appointment with their PCP within 7 days of discharge? Yes   Has the patient kept scheduled appointments due by today? Yes   Has home health visited the patient within 72 hours of discharge? N/A   Psychosocial issues? No   Did the patient receive a copy of their discharge instructions? Yes   Nursing interventions Reviewed instructions with patient   What is the patient's perception of their health status since discharge? Improving   Is the patient/caregiver able to teach back signs and symptoms related to disease process for when to call PCP? Yes   Is the patient/caregiver able to teach back signs and symptoms related to disease process for when to call 911? Yes   Is the patient/caregiver able to teach back the hierarchy of who to call/visit for symptoms/problems? PCP, Specialist, Home health nurse, Urgent Care, ED, 911 Yes   If the patient is a current smoker, are they able to teach back resources for cessation? Not a smoker   Additional teach back comments some chest pain, uses nitroglycerin 3-4 x, varies.   Week 3 Call Completed? Yes   Is the patient interested in additional calls from an ambulatory ?  NOTE:  applies to high risk patients requiring additional follow-up. No   Wrap up additional  comments He is doing ok, watering flowers tonight. Entresto is causing some issues. Has some small vessel disease which is causing some pain at times.          Aubree SMITH - Registered Nurse

## 2023-09-18 RX ORDER — LEVOTHYROXINE SODIUM 0.07 MG/1
75 TABLET ORAL DAILY
Qty: 90 TABLET | Refills: 0 | Status: SHIPPED | OUTPATIENT
Start: 2023-09-18

## 2023-09-18 RX ORDER — EMPAGLIFLOZIN 25 MG/1
TABLET, FILM COATED ORAL
Qty: 90 TABLET | Refills: 0 | Status: SHIPPED | OUTPATIENT
Start: 2023-09-18

## 2023-09-18 NOTE — TELEPHONE ENCOUNTER
Rx Refill Note  Requested Prescriptions     Pending Prescriptions Disp Refills    levothyroxine (SYNTHROID, LEVOTHROID) 75 MCG tablet [Pharmacy Med Name: LEVOTHYROXINE 0.075MG (75MCG) TABS] 90 tablet 2     Sig: TAKE 1 TABLET BY MOUTH DAILY    Jardiance 25 MG tablet tablet [Pharmacy Med Name: JARDIANCE 25MG TABLETS] 90 tablet 2     Sig: TAKE 1 TABLET BY MOUTH DAILY      Last office visit with prescribing clinician: 4/13/2023     Next office visit with prescribing clinician: 10/16/2023       Sunil Hughes MA  09/18/23, 09:19 EDT

## 2023-10-16 ENCOUNTER — OFFICE VISIT (OUTPATIENT)
Dept: ENDOCRINOLOGY | Facility: CLINIC | Age: 78
End: 2023-10-16
Payer: MEDICARE

## 2023-10-16 VITALS
OXYGEN SATURATION: 96 % | WEIGHT: 201 LBS | HEIGHT: 71 IN | HEART RATE: 77 BPM | DIASTOLIC BLOOD PRESSURE: 54 MMHG | SYSTOLIC BLOOD PRESSURE: 100 MMHG | BODY MASS INDEX: 28.14 KG/M2

## 2023-10-16 DIAGNOSIS — Z79.4 TYPE 2 DIABETES MELLITUS WITH DIABETIC POLYNEUROPATHY, WITH LONG-TERM CURRENT USE OF INSULIN: Primary | ICD-10-CM

## 2023-10-16 DIAGNOSIS — E11.42 TYPE 2 DIABETES MELLITUS WITH DIABETIC POLYNEUROPATHY, WITH LONG-TERM CURRENT USE OF INSULIN: Primary | ICD-10-CM

## 2023-10-16 DIAGNOSIS — E03.9 ACQUIRED HYPOTHYROIDISM: ICD-10-CM

## 2023-10-16 LAB
ALBUMIN UR-MCNC: <1.2 MG/DL
CREAT UR-MCNC: 65.3 MG/DL
EXPIRATION DATE: ABNORMAL
EXPIRATION DATE: NORMAL
GLUCOSE BLDC GLUCOMTR-MCNC: 81 MG/DL (ref 70–130)
HBA1C MFR BLD: 6.4 % (ref 4.5–5.7)
Lab: ABNORMAL
Lab: NORMAL
MICROALBUMIN/CREAT UR: NORMAL MG/G{CREAT}
T4 FREE SERPL-MCNC: 1.21 NG/DL (ref 0.93–1.7)
TSH SERPL DL<=0.05 MIU/L-ACNC: 4.37 UIU/ML (ref 0.27–4.2)

## 2023-10-16 PROCEDURE — 82570 ASSAY OF URINE CREATININE: CPT | Performed by: PHYSICIAN ASSISTANT

## 2023-10-16 PROCEDURE — 84439 ASSAY OF FREE THYROXINE: CPT | Performed by: PHYSICIAN ASSISTANT

## 2023-10-16 PROCEDURE — 82043 UR ALBUMIN QUANTITATIVE: CPT | Performed by: PHYSICIAN ASSISTANT

## 2023-10-16 PROCEDURE — 84443 ASSAY THYROID STIM HORMONE: CPT | Performed by: PHYSICIAN ASSISTANT

## 2023-10-16 RX ORDER — ALPRAZOLAM 0.25 MG/1
0.25 TABLET ORAL 2 TIMES DAILY PRN
COMMUNITY

## 2023-10-16 NOTE — PROGRESS NOTES
Office Note      Date: 10/16/2023  Patient Name: Remington Alvarado  MRN: [id number]  : 1945    Chief Complaint   Patient presents with    Diabetes     Type II       History of Present Illness:   Remington lAvarado is a 78 y.o. male who presents for follow-up for type 2 diabetes diagnosed in .  He remains on Jardiance 25 mg daily, metformin 1000 mg twice a day and Humulin 70/30 (he buys this over-the-counter) up to 68 units with breakfast and 68 units with supper.  He typically checks his blood sugar 1-3 times daily.  He reports his lowest readings have been in the 90s and his highest have been about 210 mg/dL.  He reports overall his blood sugars have been good.  He denies any severe or frequent hypoglycemia.  He often adjusts his insulin based on his glucose reading as well as what he is eating.  He was hospitalized May 8- and was diagnosed with heart failure.  He was taking Entresto twice a day but his blood pressure will drop too low so he is now only taking this once a day.    He reports overall he is doing okay.  He has noticed reduced energy as well as cold intolerance.  He is taking his levothyroxine 75 mcg daily.  He reports he is due for his eye exam and will get this scheduled.  He sees the podiatrist regularly and does have severe neuropathy.  He noted bruising over the top of his left foot within the last week.  He does not recall injuring it but when thinking back thinks he may have dropped something on his foot.  He had fasting labs completed while he was hospitalized.      Subjective     Review of Systems:   Review of Systems   Constitutional:  Positive for fatigue.   Cardiovascular: Negative.    Gastrointestinal: Negative.    Endocrine: Positive for cold intolerance.   Neurological:  Positive for numbness.       The following portions of the patient's history were reviewed and updated as appropriate: allergies, current medications, past family history, past medical history, past social  "history, past surgical history, and problem list.    Objective     Vitals:    10/16/23 0751   BP: 100/54   BP Location: Left arm   Patient Position: Sitting   Cuff Size: Adult   Pulse: 77   SpO2: 96%   Weight: 91.2 kg (201 lb)   Height: 179.6 cm (70.7\")     Body mass index is 28.27 kg/m².    Physical Exam  Vitals reviewed.   Constitutional:       General: He is not in acute distress.     Appearance: Normal appearance.   Neck:      Thyroid: No thyroid mass, thyromegaly or thyroid tenderness.   Cardiovascular:      Pulses:           Dorsalis pedis pulses are 1+ on the left side.   Feet:      Left foot:      Toenail Condition: Left toenails are normal.      Comments: Ecchymosis and edema noted over the top of the left foot at the base of the second and third toes.  Lymphadenopathy:      Cervical: No cervical adenopathy.   Neurological:      Mental Status: He is alert.         HEMOGLOBIN A1C  Lab Results   Component Value Date    HGBA1C 6.4 (A) 10/16/2023       GLUCOSE  Glucose   Date Value Ref Range Status   10/16/2023 81 70 - 130 mg/dL Final       CMP  Lab Results   Component Value Date    GLUCOSE 121 (H) 05/09/2023    BUN 16 05/09/2023    CREATININE 1.01 05/09/2023    EGFRIFNONA 67 07/26/2021    BCR 15.8 05/09/2023    K 4.6 05/09/2023    CO2 23.0 05/09/2023    CALCIUM 9.5 05/09/2023    AST 17 05/08/2023    ALT 12 05/08/2023       LIPID PANEL  Lab Results   Component Value Date    CHOL 99 05/09/2023    TRIG 360 (H) 05/09/2023    HDL 27 (L) 05/09/2023    LDL 21 05/09/2023       URINE MICROALBUMIN/CREATININE RATIO  Microalbumin/Creatinine Ratio   Date Value Ref Range Status   09/01/2022   Final     Comment:     Unable to calculate       TSH  Lab Results   Component Value Date    TSH 3.360 05/09/2023       Assessment / Plan      Assessment & Plan:  1. Type 2 diabetes mellitus with diabetic polyneuropathy, with long-term current use of insulin  His hemoglobin A1c today is 6.4%.  He denies any trouble with hypoglycemia.  " For now we will continue Jardiance 25 mg daily, metformin 1000 mg twice a day and Humulin 70/30 up to 68 units with breakfast and supper.  He will send in blood sugar readings for review and adjustment if needed.  His weight is stable.  I encouraged healthy eating habits and physical activity as tolerated.  He does have some bruising and swelling over the top of his left foot at the base of his toes.  I encouraged him to check in early with his podiatrist for an x-ray to make sure nothing is broken.  He plans to reschedule his appointment.  I encouraged him to schedule his eye exam today.  His blood pressure is okay today.  Urine microalbumin/creatinine ratio pending today.  Will send note with results and plan.  - POC Glucose, Blood  - POC Glycosylated Hemoglobin (Hb A1C)  - Microalbumin / Creatinine Urine Ratio - Urine, Clean Catch; Future  - Microalbumin / Creatinine Urine Ratio - Urine, Clean Catch    2. Acquired hypothyroidism  His TSH had crept up while he was in the hospital in May.  We will check TFTs today.  We discussed considering increasing his dose if his TSH is high normal.  We will send note with results and plan.  - T4, Free; Future  - TSH; Future  - T4, Free  - TSH      Return in about 4 months (around 2/16/2024) for Recheck.     This note was dictated using Dragon voice recognition.    Cira Baca PA-C  10/16/2023

## 2023-10-17 ENCOUNTER — PATIENT MESSAGE (OUTPATIENT)
Dept: ENDOCRINOLOGY | Facility: CLINIC | Age: 78
End: 2023-10-17
Payer: MEDICARE

## 2023-10-17 RX ORDER — LEVOTHYROXINE SODIUM 88 UG/1
88 TABLET ORAL DAILY
Qty: 90 TABLET | Refills: 1 | Status: SHIPPED | OUTPATIENT
Start: 2023-10-17 | End: 2024-10-16

## 2023-11-13 ENCOUNTER — HOSPITAL ENCOUNTER (EMERGENCY)
Facility: HOSPITAL | Age: 78
Discharge: HOME | End: 2023-11-13
Attending: EMERGENCY MEDICINE | Admitting: EMERGENCY MEDICINE
Payer: MEDICARE

## 2023-11-13 ENCOUNTER — APPOINTMENT (OUTPATIENT)
Dept: GENERAL RADIOLOGY | Facility: HOSPITAL | Age: 78
End: 2023-11-13
Payer: MEDICARE

## 2023-11-13 VITALS
DIASTOLIC BLOOD PRESSURE: 66 MMHG | TEMPERATURE: 97.9 F | BODY MASS INDEX: 29.18 KG/M2 | HEIGHT: 69 IN | OXYGEN SATURATION: 99 % | SYSTOLIC BLOOD PRESSURE: 117 MMHG | RESPIRATION RATE: 16 BRPM | WEIGHT: 197 LBS | HEART RATE: 76 BPM

## 2023-11-13 DIAGNOSIS — R79.89 ELEVATED SERUM CREATININE: Primary | ICD-10-CM

## 2023-11-13 DIAGNOSIS — E13.49 OTHER DIABETIC NEUROLOGICAL COMPLICATION ASSOCIATED WITH OTHER SPECIFIED DIABETES MELLITUS: ICD-10-CM

## 2023-11-13 DIAGNOSIS — Z01.89 EXAMINATION FOR, LABORATORY: ICD-10-CM

## 2023-11-13 LAB
ALBUMIN SERPL-MCNC: 4.5 G/DL (ref 3.5–5.2)
ALBUMIN/GLOB SERPL: 1.7 G/DL
ALP SERPL-CCNC: 60 U/L (ref 39–117)
ALT SERPL W P-5'-P-CCNC: 13 U/L (ref 1–41)
ANION GAP SERPL CALCULATED.3IONS-SCNC: 13 MMOL/L (ref 5–15)
AST SERPL-CCNC: 22 U/L (ref 1–40)
BASOPHILS # BLD AUTO: 0.01 10*3/MM3 (ref 0–0.2)
BASOPHILS NFR BLD AUTO: 0.2 % (ref 0–1.5)
BILIRUB SERPL-MCNC: 0.7 MG/DL (ref 0–1.2)
BUN SERPL-MCNC: 18 MG/DL (ref 8–23)
BUN/CREAT SERPL: 13.2 (ref 7–25)
CALCIUM SPEC-SCNC: 9.1 MG/DL (ref 8.6–10.5)
CHLORIDE SERPL-SCNC: 101 MMOL/L (ref 98–107)
CO2 SERPL-SCNC: 21 MMOL/L (ref 22–29)
CREAT SERPL-MCNC: 1.36 MG/DL (ref 0.76–1.27)
DEPRECATED RDW RBC AUTO: 49.3 FL (ref 37–54)
EGFRCR SERPLBLD CKD-EPI 2021: 53.9 ML/MIN/1.73
EOSINOPHIL # BLD AUTO: 0.11 10*3/MM3 (ref 0–0.4)
EOSINOPHIL NFR BLD AUTO: 2 % (ref 0.3–6.2)
ERYTHROCYTE [DISTWIDTH] IN BLOOD BY AUTOMATED COUNT: 14.3 % (ref 12.3–15.4)
GLOBULIN UR ELPH-MCNC: 2.7 GM/DL
GLUCOSE SERPL-MCNC: 210 MG/DL (ref 65–99)
HCT VFR BLD AUTO: 42.6 % (ref 37.5–51)
HGB BLD-MCNC: 13 G/DL (ref 13–17.7)
HOLD SPECIMEN: NORMAL
IMM GRANULOCYTES # BLD AUTO: 0.01 10*3/MM3 (ref 0–0.05)
IMM GRANULOCYTES NFR BLD AUTO: 0.2 % (ref 0–0.5)
LYMPHOCYTES # BLD AUTO: 1.19 10*3/MM3 (ref 0.7–3.1)
LYMPHOCYTES NFR BLD AUTO: 22 % (ref 19.6–45.3)
MCH RBC QN AUTO: 28.5 PG (ref 26.6–33)
MCHC RBC AUTO-ENTMCNC: 30.5 G/DL (ref 31.5–35.7)
MCV RBC AUTO: 93.4 FL (ref 79–97)
MONOCYTES # BLD AUTO: 0.6 10*3/MM3 (ref 0.1–0.9)
MONOCYTES NFR BLD AUTO: 11.1 % (ref 5–12)
NEUTROPHILS NFR BLD AUTO: 3.48 10*3/MM3 (ref 1.7–7)
NEUTROPHILS NFR BLD AUTO: 64.5 % (ref 42.7–76)
NRBC BLD AUTO-RTO: 0 /100 WBC (ref 0–0.2)
NT-PROBNP SERPL-MCNC: 273.3 PG/ML (ref 0–1800)
PLATELET # BLD AUTO: 238 10*3/MM3 (ref 140–450)
PMV BLD AUTO: 9.1 FL (ref 6–12)
POTASSIUM SERPL-SCNC: 4.9 MMOL/L (ref 3.5–5.2)
PROT SERPL-MCNC: 7.2 G/DL (ref 6–8.5)
RBC # BLD AUTO: 4.56 10*6/MM3 (ref 4.14–5.8)
SODIUM SERPL-SCNC: 135 MMOL/L (ref 136–145)
TROPONIN T SERPL HS-MCNC: 22 NG/L
TROPONIN T SERPL HS-MCNC: 23 NG/L
WBC NRBC COR # BLD: 5.4 10*3/MM3 (ref 3.4–10.8)
WHOLE BLOOD HOLD COAG: NORMAL
WHOLE BLOOD HOLD SPECIMEN: NORMAL

## 2023-11-13 PROCEDURE — 80053 COMPREHEN METABOLIC PANEL: CPT | Performed by: EMERGENCY MEDICINE

## 2023-11-13 PROCEDURE — 71045 X-RAY EXAM CHEST 1 VIEW: CPT

## 2023-11-13 PROCEDURE — 93005 ELECTROCARDIOGRAM TRACING: CPT | Performed by: EMERGENCY MEDICINE

## 2023-11-13 PROCEDURE — 84484 ASSAY OF TROPONIN QUANT: CPT | Performed by: EMERGENCY MEDICINE

## 2023-11-13 PROCEDURE — 85025 COMPLETE CBC W/AUTO DIFF WBC: CPT | Performed by: EMERGENCY MEDICINE

## 2023-11-13 PROCEDURE — 36415 COLL VENOUS BLD VENIPUNCTURE: CPT

## 2023-11-13 PROCEDURE — 83880 ASSAY OF NATRIURETIC PEPTIDE: CPT | Performed by: EMERGENCY MEDICINE

## 2023-11-13 PROCEDURE — 99284 EMERGENCY DEPT VISIT MOD MDM: CPT

## 2023-11-13 PROCEDURE — 93005 ELECTROCARDIOGRAM TRACING: CPT

## 2023-11-13 RX ORDER — SODIUM CHLORIDE 0.9 % (FLUSH) 0.9 %
10 SYRINGE (ML) INJECTION AS NEEDED
Status: DISCONTINUED | OUTPATIENT
Start: 2023-11-13 | End: 2023-11-14 | Stop reason: HOSPADM

## 2023-11-13 RX ORDER — ALPRAZOLAM 0.25 MG/1
0.25 TABLET ORAL ONCE
Status: DISCONTINUED | OUTPATIENT
Start: 2023-11-13 | End: 2023-11-14 | Stop reason: HOSPADM

## 2023-11-13 NOTE — ED PROVIDER NOTES
Subjective   History of Present Illness  Patient is a pleasant 78-year-old male with history of coronary artery disease, followed by Dr. Arreguin, cardiology, presents to the emergency department today with the elevated troponin.  Patient states that has known coronary artery disease in multiple vessels that unfortunately cannot be addressed with stents.  He takes nitroglycerin approximately every 2 to 3 days due to mild chest discomfort.  It is typically relieved with nitroglycerin and this is unchanged.  He has had no worsening chest pain and no change in the resolution with nitroglycerin.  He states that last week he had developed bilateral lower extremity edema which is new for him.  Has no history of lower extremity edema in the past.  He has all his primary care provider on Friday and was started on Lasix.  He took the Lasix as prescribed and the edema resolved over the weekend.  He followed back with his primary care provider today and labs were drawn including troponin.  Troponin was elevated at 105 and it was recommended that he come to the emergency department for evaluation.  Patient also spoke with his cardiologist, Dr. Arreguin, and they also recommended that he come to the emergency department for evaluation.  Again, denies a change in his chest discomfort but has had intermittent chest discomfort as well as his current baseline.  Denies fever, chills, difficulty breathing, abdominal pain, vomiting, diarrhea, or other acute complaints.      Review of Systems   All other systems reviewed and are negative.      Past Medical History:   Diagnosis Date    Arrhythmia     Coronary artery disease     Disease of thyroid gland     GERD (gastroesophageal reflux disease)     Heart attack     Heart disease     Hyperlipidemia     Hypertension     Hypothyroidism     Neuropathy due to type 2 diabetes mellitus     Peripheral neuropathy     Pure hyperglyceridemia     Type 2 diabetes mellitus        Allergies   Allergen  Reactions    Sulfa Antibiotics        Past Surgical History:   Procedure Laterality Date    BACK SURGERY      CARDIAC CATHETERIZATION      CARDIAC CATHETERIZATION N/A 2021    Procedure: Left Heart Cath;  Surgeon: Martin Arreguin MD;  Location:  [facility];  Service: Cardiovascular;  Laterality: N/A;    CARDIAC CATHETERIZATION N/A 2021    Procedure: Left Heart Cath;  Surgeon: Martin Arreguin MD;  Location:  [facility];  Service: Cardiovascular;  Laterality: N/A;    CARDIAC CATHETERIZATION N/A 5/10/2023    Procedure: Left Heart Cath;  Surgeon: Sang Easno MD;  Location:  [facility];  Service: Cardiology;  Laterality: N/A;    CARDIAC DEFIBRILLATOR PLACEMENT      CARDIAC SURGERY      CHOLECYSTECTOMY      CORONARY ANGIOPLASTY      CORONARY ARTERY BYPASS GRAFT      CORONARY STENT PLACEMENT      HERNIA REPAIR      MOUTH SURGERY      ORIF ULNA/RADIUS FRACTURES      SPINAL CORD STIMULATOR IMPLANT      SPINAL CORD STIMULATOR REMOVAL         Family History   Problem Relation Age of Onset    Diabetes Mother     Heart attack Father     Heart disease Father     Stroke Father     Hypertension Father     Diabetes Father     Heart attack Brother     Heart disease Brother     Hypertension Brother     Heart attack Paternal Uncle     Heart disease Paternal Uncle        Social History     Socioeconomic History    Marital status:    Tobacco Use    Smoking status: Former     Types: Cigarettes     Quit date: 1981     Years since quittin.3    Smokeless tobacco: Never    Tobacco comments:     Quit 40 years ago   Vaping Use    Vaping Use: Never used   Substance and Sexual Activity    Alcohol use: Not Currently    Drug use: Never    Sexual activity: Not Currently           Objective   Physical Exam  Vitals and nursing note reviewed.   Constitutional:       General: He is not in acute distress.     Appearance: Normal appearance.   HENT:      Head:  Normocephalic and atraumatic.   Eyes:      Conjunctiva/sclera: Conjunctivae normal.      Pupils: Pupils are equal, round, and reactive to light.   Neck:      Thyroid: No thyromegaly.   Cardiovascular:      Rate and Rhythm: Normal rate and regular rhythm.      Heart sounds: Normal heart sounds. No murmur heard.     No friction rub. No gallop.   Pulmonary:      Effort: Pulmonary effort is normal. No respiratory distress.      Breath sounds: Normal breath sounds.   Chest:      Chest wall: No tenderness.   Abdominal:      Palpations: Abdomen is soft.      Tenderness: There is no abdominal tenderness.   Musculoskeletal:         General: Normal range of motion.      Cervical back: Normal range of motion and neck supple.   Lymphadenopathy:      Cervical: No cervical adenopathy.   Skin:     General: Skin is warm and dry.   Neurological:      Mental Status: He is alert and oriented to person, place, and time.   Psychiatric:         Behavior: Behavior normal.         Thought Content: Thought content normal.         Procedures           ED Course      Recent Results (from the past 24 hour(s))   ECG 12 Lead ED Triage Standing Order; SOA    Collection Time: 11/13/23  5:52 PM   Result Value Ref Range    QT Interval 378 ms    QTC Interval 452 ms   Comprehensive Metabolic Panel    Collection Time: 11/13/23  6:04 PM    Specimen: Blood   Result Value Ref Range    Glucose 210 (H) 65 - 99 mg/dL    BUN 18 8 - 23 mg/dL    Creatinine 1.36 (H) 0.76 - 1.27 mg/dL    Sodium 135 (L) 136 - 145 mmol/L    Potassium 4.9 3.5 - 5.2 mmol/L    Chloride 101 98 - 107 mmol/L    CO2 21.0 (L) 22.0 - 29.0 mmol/L    Calcium 9.1 8.6 - 10.5 mg/dL    Total Protein 7.2 6.0 - 8.5 g/dL    Albumin 4.5 3.5 - 5.2 g/dL    ALT (SGPT) 13 1 - 41 U/L    AST (SGOT) 22 1 - 40 U/L    Alkaline Phosphatase 60 39 - 117 U/L    Total Bilirubin 0.7 0.0 - 1.2 mg/dL    Globulin 2.7 gm/dL    A/G Ratio 1.7 g/dL    BUN/Creatinine Ratio 13.2 7.0 - 25.0    Anion Gap 13.0 5.0 - 15.0  mmol/L    eGFR 53.9 (L) >60.0 mL/min/1.73   BNP    Collection Time: 11/13/23  6:04 PM    Specimen: Blood   Result Value Ref Range    proBNP 273.3 0.0 - 1,800.0 pg/mL   Lavender Top    Collection Time: 11/13/23  6:04 PM   Result Value Ref Range    Extra Tube hold for add-on    Gold Top - SST    Collection Time: 11/13/23  6:04 PM   Result Value Ref Range    Extra Tube Hold for add-ons.    Gray Top    Collection Time: 11/13/23  6:04 PM   Result Value Ref Range    Extra Tube Hold for add-ons.    Light Blue Top    Collection Time: 11/13/23  6:04 PM   Result Value Ref Range    Extra Tube Hold for add-ons.    CBC Auto Differential    Collection Time: 11/13/23  6:04 PM    Specimen: Blood   Result Value Ref Range    WBC 5.40 3.40 - 10.80 10*3/mm3    RBC 4.56 4.14 - 5.80 10*6/mm3    Hemoglobin 13.0 13.0 - 17.7 g/dL    Hematocrit 42.6 37.5 - 51.0 %    MCV 93.4 79.0 - 97.0 fL    MCH 28.5 26.6 - 33.0 pg    MCHC 30.5 (L) 31.5 - 35.7 g/dL    RDW 14.3 12.3 - 15.4 %    RDW-SD 49.3 37.0 - 54.0 fl    MPV 9.1 6.0 - 12.0 fL    Platelets 238 140 - 450 10*3/mm3    Neutrophil % 64.5 42.7 - 76.0 %    Lymphocyte % 22.0 19.6 - 45.3 %    Monocyte % 11.1 5.0 - 12.0 %    Eosinophil % 2.0 0.3 - 6.2 %    Basophil % 0.2 0.0 - 1.5 %    Immature Grans % 0.2 0.0 - 0.5 %    Neutrophils, Absolute 3.48 1.70 - 7.00 10*3/mm3    Lymphocytes, Absolute 1.19 0.70 - 3.10 10*3/mm3    Monocytes, Absolute 0.60 0.10 - 0.90 10*3/mm3    Eosinophils, Absolute 0.11 0.00 - 0.40 10*3/mm3    Basophils, Absolute 0.01 0.00 - 0.20 10*3/mm3    Immature Grans, Absolute 0.01 0.00 - 0.05 10*3/mm3    nRBC 0.0 0.0 - 0.2 /100 WBC   Single High Sensitivity Troponin T    Collection Time: 11/13/23  6:30 PM    Specimen: Blood   Result Value Ref Range    HS Troponin T 23 (H) <22 ng/L   Green Top (Gel)    Collection Time: 11/13/23  6:30 PM   Result Value Ref Range    Extra Tube Hold for add-ons.    High Sensitivity Troponin T    Collection Time: 11/13/23  7:59 PM    Specimen: Blood    Result Value Ref Range    HS Troponin T 22 (H) <22 ng/L     Note: In addition to lab results from this visit, the labs listed above may include labs taken at another facility or during a different encounter within the last 24 hours. Please correlate lab times with ED admission and discharge times for further clarification of the services performed during this visit.    XR Chest 1 View   Final Result   Impression:   No active cardiopulmonary disease         Electronically Signed: Jimbo Hughes     11/13/2023 7:05 PM EST     Workstation ID: [id number]        Vitals:    11/13/23 2158 11/13/23 2200 11/13/23 2203 11/13/23 2205   BP:       BP Location:       Patient Position:       Pulse: 74 74 72 76   Resp:       Temp:       TempSrc:       SpO2: 95% 98% 100% 99%   Weight:       Height:         Medications - No data to display  ECG/EMG Results (last 24 hours)       ** No results found for the last 24 hours. **          ECG 12 Lead ED Triage Standing Order; SOA   Preliminary Result   Test Reason : ED Triage Standing Order~   Blood Pressure :   */*   mmHG   Vent. Rate :  86 BPM     Atrial Rate :  86 BPM      P-R Int : 158 ms          QRS Dur :  92 ms       QT Int : 378 ms       P-R-T Axes :  -3  22  64 degrees      QTc Int : 452 ms      Sinus rhythm with frequent premature ventricular complexes   Anteroseptal infarct (cited on or before 01-JUN-2014)   Abnormal ECG   When compared with ECG of 09-MAY-2023 06:36,   premature ventricular complexes are now present      Referred By:            Confirmed By:                                                Medical Decision Making  Troponin x 2 negative.  Case discussed with Dr. Arreguin including recent laboratory results.  As pt has no new symptoms and non trending troponin, pt can follow up for further evaluation and management.  Pt and family understand to have a low threshold to return to the ED if symptoms persist, worsen, or other concerns arise.    Problems  Addressed:  Elevated serum creatinine: complicated acute illness or injury  Examination for, laboratory: complicated acute illness or injury  Other diabetic neurological complication associated with other specified diabetes mellitus: complicated acute illness or injury    Amount and/or Complexity of Data Reviewed  External Data Reviewed: notes.  Labs: ordered. Decision-making details documented in ED Course.  Radiology: ordered and independent interpretation performed. Decision-making details documented in ED Course.  ECG/medicine tests: ordered and independent interpretation performed. Decision-making details documented in ED Course.    Risk  Prescription drug management.        Final diagnoses:   Elevated serum creatinine   Examination for, laboratory   Other diabetic neurological complication associated with other specified diabetes mellitus       ED Disposition  ED Disposition       ED Disposition   Discharge    Condition   Stable    Comment   --           DISCHARGE    Patient discharged in stable condition.    Reviewed implications of results, diagnosis, meds, responsibility to follow up, warning signs and symptoms of possible worsening, potential complications and reasons to return to ER.    Patient/Family voiced understanding of above instructions.    Discussed plan for discharge, as there is no emergent indication for admission.  Pt/family is agreeable and understands need for follow up and possible repeat testing.  Pt/family is aware that discharge does not mean that nothing is wrong but that it indicates no emergency is currently present that requires admission and they must continue care with follow-up as given below or with a physician of their choice.     FOLLOW-UP  Disha Velez,   [address] [place] [id number] [phone]    Schedule an appointment as soon as possible for a visit       [facility] EMERGENCY DEPARTMENT  [address] [facility] [id number] [phone]    If symptoms worsen         Medication List        Changed      sacubitril-valsartan 24-26 MG tablet  Commonly known as: ENTRESTO  Take 1 tablet by mouth Every 12 (Twelve) Hours.  What changed: when to take this                 Harley Benton DO  11/16/23 [id number]

## 2023-11-18 LAB
QT INTERVAL: 378 MS
QTC INTERVAL: 452 MS

## 2023-12-11 RX ORDER — LEVOTHYROXINE SODIUM 0.07 MG/1
75 TABLET ORAL DAILY
Qty: 90 TABLET | Refills: 0 | OUTPATIENT
Start: 2023-12-11

## 2024-01-01 ENCOUNTER — APPOINTMENT (OUTPATIENT)
Dept: GENERAL RADIOLOGY | Facility: HOSPITAL | Age: 79
End: 2024-01-01
Payer: MEDICARE

## 2024-01-01 ENCOUNTER — HOSPITAL ENCOUNTER (EMERGENCY)
Facility: HOSPITAL | Age: 79
End: 2024-12-18
Attending: EMERGENCY MEDICINE
Payer: MEDICARE

## 2024-01-01 DIAGNOSIS — E78.5 DYSLIPIDEMIA: ICD-10-CM

## 2024-01-01 DIAGNOSIS — J96.00 ACUTE RESPIRATORY FAILURE, UNSPECIFIED WHETHER WITH HYPOXIA OR HYPERCAPNIA: ICD-10-CM

## 2024-01-01 DIAGNOSIS — Z86.79 HISTORY OF CAD (CORONARY ARTERY DISEASE): ICD-10-CM

## 2024-01-01 DIAGNOSIS — I46.9 CARDIAC ARREST: Primary | ICD-10-CM

## 2024-01-01 DIAGNOSIS — Z86.79 HISTORY OF CHRONIC HYPERTENSION: ICD-10-CM

## 2024-01-01 LAB
BUN BLDA-MCNC: 23 MG/DL (ref 8–26)
CA-I BLDA-SCNC: 1.43 MMOL/L (ref 1.2–1.32)
CHLORIDE BLDA-SCNC: 106 MMOL/L (ref 98–109)
CO2 BLDA-SCNC: 20 MMOL/L (ref 24–29)
CREAT BLDA-MCNC: 1.5 MG/DL (ref 0.6–1.3)
EGFRCR SERPLBLD CKD-EPI 2021: 47.7 ML/MIN/1.73
GLUCOSE BLDC GLUCOMTR-MCNC: 414 MG/DL (ref 70–130)
HCT VFR BLDA CALC: 36 % (ref 38–51)
HGB BLDA-MCNC: 12.2 G/DL (ref 12–17)
POTASSIUM BLDA-SCNC: 5.8 MMOL/L (ref 3.5–4.9)
SODIUM BLD-SCNC: 135 MMOL/L (ref 138–146)

## 2024-01-01 PROCEDURE — 25010000002 EPINEPHRINE 1 MG/10ML SOLUTION PREFILLED SYRINGE

## 2024-01-01 PROCEDURE — 92950 HEART/LUNG RESUSCITATION CPR: CPT

## 2024-01-01 PROCEDURE — 94799 UNLISTED PULMONARY SVC/PX: CPT

## 2024-01-01 PROCEDURE — 25010000002 AMIODARONE PER 30 MG: Performed by: EMERGENCY MEDICINE

## 2024-01-01 PROCEDURE — 25010000002 TENECTEPLASE PER 50 MG: Performed by: EMERGENCY MEDICINE

## 2024-01-01 PROCEDURE — 25010000002 EPINEPHRINE 1 MG/10ML SOLUTION PREFILLED SYRINGE: Performed by: EMERGENCY MEDICINE

## 2024-01-01 PROCEDURE — 80047 BASIC METABLC PNL IONIZED CA: CPT

## 2024-01-01 PROCEDURE — 85014 HEMATOCRIT: CPT

## 2024-01-01 PROCEDURE — 99285 EMERGENCY DEPT VISIT HI MDM: CPT

## 2024-01-01 RX ORDER — NITROGLYCERIN 0.4 MG/1
0.4 TABLET SUBLINGUAL
Status: DISCONTINUED | OUTPATIENT
Start: 2024-01-01 | End: 2024-01-01 | Stop reason: HOSPADM

## 2024-01-01 RX ORDER — CALCIUM CHLORIDE 100 MG/ML
INJECTION INTRAVENOUS; INTRAVENTRICULAR
Status: COMPLETED | OUTPATIENT
Start: 2024-01-01 | End: 2024-01-01

## 2024-01-01 RX ORDER — AMIODARONE HYDROCHLORIDE 150 MG/3ML
INJECTION, SOLUTION INTRAVENOUS
Status: COMPLETED | OUTPATIENT
Start: 2024-01-01 | End: 2024-01-01

## 2024-01-01 RX ORDER — SODIUM CHLORIDE 0.9 % (FLUSH) 0.9 %
10 SYRINGE (ML) INJECTION AS NEEDED
Status: DISCONTINUED | OUTPATIENT
Start: 2024-01-01 | End: 2024-01-01 | Stop reason: HOSPADM

## 2024-01-01 RX ADMIN — EPINEPHRINE 1 MG: 0.1 INJECTION INTRAVENOUS at 01:08

## 2024-01-01 RX ADMIN — EPINEPHRINE 1 MG: 0.1 INJECTION INTRAVENOUS at 01:16

## 2024-01-01 RX ADMIN — EPINEPHRINE 1 MG: 0.1 INJECTION INTRAVENOUS at 01:19

## 2024-01-01 RX ADMIN — Medication 10 ML: at 01:22

## 2024-01-01 RX ADMIN — EPINEPHRINE 1 MG: 0.1 INJECTION INTRAVENOUS at 01:22

## 2024-01-01 RX ADMIN — CALCIUM CHLORIDE 1 G: 100 INJECTION INTRAVENOUS; INTRAVENTRICULAR at 01:23

## 2024-01-01 RX ADMIN — SODIUM BICARBONATE 50 MEQ: 84 INJECTION INTRAVENOUS at 01:13

## 2024-01-01 RX ADMIN — AMIODARONE HYDROCHLORIDE 300 MG: 50 INJECTION, SOLUTION INTRAVENOUS at 01:11

## 2024-01-01 RX ADMIN — AMIODARONE HYDROCHLORIDE 150 MG: 50 INJECTION, SOLUTION INTRAVENOUS at 01:16

## 2024-01-01 RX ADMIN — TENECTEPLASE 50 MG: KIT at 01:23

## 2024-01-01 RX ADMIN — CALCIUM CHLORIDE 1 G: 100 INJECTION INTRAVENOUS; INTRAVENTRICULAR at 01:06

## 2024-01-01 RX ADMIN — EPINEPHRINE 1 MG: 0.1 INJECTION INTRAVENOUS at 01:12

## 2024-01-01 RX ADMIN — EPINEPHRINE 1 MG: 0.1 INJECTION INTRAVENOUS at 01:04

## 2024-01-01 RX ADMIN — Medication 10 ML: at 01:24

## 2024-02-09 NOTE — TELEPHONE ENCOUNTER
Rx Refill Note  Requested Prescriptions     Pending Prescriptions Disp Refills    metFORMIN (GLUCOPHAGE) 1000 MG tablet [Pharmacy Med Name: METFORMIN 1000MG TABLETS] 180 tablet 1     Sig: TAKE 1 TABLET BY MOUTH TWICE DAILY WITH MEALS      Last office visit with prescribing clinician: 10/16/2023     Next office visit with prescribing clinician: 2/22/2024       Sunil Hughes MA  02/09/24, 08:49 EST

## 2024-02-22 ENCOUNTER — OFFICE VISIT (OUTPATIENT)
Dept: ENDOCRINOLOGY | Facility: CLINIC | Age: 79
End: 2024-02-22
Payer: MEDICARE

## 2024-02-22 VITALS
WEIGHT: 198 LBS | OXYGEN SATURATION: 99 % | BODY MASS INDEX: 28.35 KG/M2 | HEART RATE: 63 BPM | HEIGHT: 70 IN | DIASTOLIC BLOOD PRESSURE: 62 MMHG | SYSTOLIC BLOOD PRESSURE: 112 MMHG

## 2024-02-22 DIAGNOSIS — E11.42 TYPE 2 DIABETES MELLITUS WITH DIABETIC POLYNEUROPATHY, WITH LONG-TERM CURRENT USE OF INSULIN: Primary | ICD-10-CM

## 2024-02-22 DIAGNOSIS — E03.9 ACQUIRED HYPOTHYROIDISM: ICD-10-CM

## 2024-02-22 DIAGNOSIS — Z79.4 TYPE 2 DIABETES MELLITUS WITH DIABETIC POLYNEUROPATHY, WITH LONG-TERM CURRENT USE OF INSULIN: Primary | ICD-10-CM

## 2024-02-22 LAB
EXPIRATION DATE: ABNORMAL
EXPIRATION DATE: ABNORMAL
GLUCOSE BLDC GLUCOMTR-MCNC: 166 MG/DL (ref 70–130)
HBA1C MFR BLD: 6.5 % (ref 4.5–5.7)
Lab: ABNORMAL
Lab: ABNORMAL
T4 FREE SERPL-MCNC: 1.39 NG/DL (ref 0.93–1.7)
TSH SERPL DL<=0.05 MIU/L-ACNC: 0.94 UIU/ML (ref 0.27–4.2)

## 2024-02-22 PROCEDURE — 84443 ASSAY THYROID STIM HORMONE: CPT | Performed by: PHYSICIAN ASSISTANT

## 2024-02-22 PROCEDURE — 84439 ASSAY OF FREE THYROXINE: CPT | Performed by: PHYSICIAN ASSISTANT

## 2024-02-22 RX ORDER — ALPRAZOLAM 0.5 MG/1
0.5 TABLET ORAL NIGHTLY PRN
COMMUNITY
Start: 2024-02-19

## 2024-02-22 RX ORDER — FUROSEMIDE 20 MG/1
20 TABLET ORAL
COMMUNITY
Start: 2023-11-10

## 2024-02-22 NOTE — PROGRESS NOTES
"     Office Note      Date: 2024  Patient Name: Remington Alvarado  MRN: [id number]  : 1945    Chief Complaint   Patient presents with    Diabetes     Type 2 diabetes mellitus with diabetic polyneuropathy, with long-term current use of insulin         History of Present Illness:   Remington Alvarado is a 78 y.o. male who presents for follow-up for type 2 diabetes diagnosed in .  He remains on Jardiance 25 mg daily, metformin 1000 mg twice a day and Humulin 70/30 62 units twice a day (he purchases this over-the-counter at [employer].)  He reports overall his blood sugars have been okay.  He reduced his insulin several weeks ago because he was having some trouble with hypoglycemia.  This has been better on the reduced dose.  He is up-to-date on his eye exam he reports he had an appointment in January and was told he has the beginnings of cataracts and may have macular degeneration.  He sees his podiatrist every 7 weeks and denies any trouble with his feet today.  He continues to see the pain clinic for neuropathy pain.  He has had some issues with his increased reflux and abdominal pain recently.  They increase his omeprazole.  We increased his levothyroxine to 88 mcg daily last visit.  He reports he is taking this regularly and correctly.      Subjective     Review of Systems:   Review of Systems   Constitutional: Negative.    Cardiovascular: Negative.    Gastrointestinal: Negative.    Endocrine: Negative.    Neurological: Negative.        The following portions of the patient's history were reviewed and updated as appropriate: allergies, current medications, past family history, past medical history, past social history, past surgical history, and problem list.    Objective     Vitals:    24 1246   BP: 112/62   BP Location: Left arm   Patient Position: Sitting   Cuff Size: Adult   Pulse: 63   SpO2: 99%   Weight: 89.8 kg (198 lb)   Height: 177.8 cm (70\")     Body mass index is 28.41 kg/m².    Physical " Exam  Vitals reviewed.   Constitutional:       General: He is not in acute distress.     Appearance: Normal appearance.   Neurological:      Mental Status: He is alert.         HEMOGLOBIN A1C  Lab Results   Component Value Date    HGBA1C 6.5 (A) 02/22/2024       GLUCOSE  Glucose   Date Value Ref Range Status   02/22/2024 166 (A) 70 - 130 mg/dL Final       CMP  Lab Results   Component Value Date    GLUCOSE 210 (H) 11/13/2023    BUN 18 11/13/2023    CREATININE 1.36 (H) 11/13/2023    EGFRIFNONA 67 07/26/2021    BCR 13.2 11/13/2023    K 4.9 11/13/2023    CO2 21.0 (L) 11/13/2023    CALCIUM 9.1 11/13/2023    AST 22 11/13/2023    ALT 13 11/13/2023       LIPID PANEL  Lab Results   Component Value Date    CHOL 99 05/09/2023    TRIG 360 (H) 05/09/2023    HDL 27 (L) 05/09/2023    LDL 21 05/09/2023       URINE MICROALBUMIN/CREATININE RATIO  Microalbumin/Creatinine Ratio   Date Value Ref Range Status   10/16/2023   Final     Comment:     Unable to calculate       TSH  Lab Results   Component Value Date    TSH 4.370 (H) 10/16/2023       Assessment / Plan      Assessment & Plan:  1. Type 2 diabetes mellitus with diabetic polyneuropathy, with long-term current use of insulin  His hemoglobin A1c is stable and to goal at 6.5%.  For now we will continue Jardiance 25 mg daily, metformin 1000 mg twice a day and Humulin 70/30 62 units twice a day with breakfast and supper.  Refilled his Jardiance today.  His CMP from February 19 showed normal creatinine level.  His weight is down 3 pounds since his appointment in October.  I encouraged healthy eating habits and physical activity as tolerated.  Blood pressure is okay today.  - POC Glycosylated Hemoglobin (Hb A1C)  - POC Glucose, Blood    2. Acquired hypothyroidism  TFTs pending today.  Will send note with results and plan.  For now he will continue levothyroxine 88 mcg daily.  I will refill his prescription once his labs have been reviewed.  - T4, Free; Future  - TSH; Future  - T4,  Free  - TSH      Return in about 6 months (around 8/22/2024) for Recheck.     This note was dictated using Dragon voice recognition.    Cira Baca PA-C  02/22/2024

## 2024-02-23 RX ORDER — LEVOTHYROXINE SODIUM 88 UG/1
88 TABLET ORAL DAILY
Qty: 90 TABLET | Refills: 2 | Status: SHIPPED | OUTPATIENT
Start: 2024-02-23 | End: 2025-02-22

## 2024-02-26 ENCOUNTER — PATIENT MESSAGE (OUTPATIENT)
Dept: ENDOCRINOLOGY | Facility: CLINIC | Age: 79
End: 2024-02-26
Payer: MEDICARE

## 2024-02-26 NOTE — TELEPHONE ENCOUNTER
Sobia Hidalgo (Camilla) 2/26/2024 12:48 PM EST      ----- Message -----  From: Remington Alvarado  Sent: 2/26/2024 11:59 AM EST  To: [facility]  Subject: Jardiance     There are several questions, and I really need to talk to her somehowPLEASE!!!

## 2024-03-07 ENCOUNTER — TELEPHONE (OUTPATIENT)
Dept: ENDOCRINOLOGY | Facility: CLINIC | Age: 79
End: 2024-03-07
Payer: MEDICARE

## 2024-03-07 RX ORDER — DAPAGLIFLOZIN 10 MG/1
10 TABLET, FILM COATED ORAL EVERY MORNING
Qty: 90 TABLET | Refills: 1 | Status: SHIPPED | OUTPATIENT
Start: 2024-03-07

## 2024-03-07 NOTE — TELEPHONE ENCOUNTER
I see the letter in his chart now-- it looks like his insurance prefers Farxiga-- I think he will have to try  and fail this before we can do an appeal letter.  We could get him started on some samples of Farxiga 10 mg if we have any-- but I think he will have to try this first.  I should also send a prescription to document he is using this.  Will you let him know and let me know where he wants the prescription to go.  I suspect he will do fine on the Farxiga-- it will be very similar to the Jardiance-- same type of medication etc.  Thanks RT

## 2024-03-07 NOTE — TELEPHONE ENCOUNTER
Patient notified.  Would like to  samples.  Samples placed at .  Requested rx be sent to [facility] on file.

## 2024-03-07 NOTE — TELEPHONE ENCOUNTER
I know you sent him a message about this - do you want to do an appeal letter?  I dont think we have done a PA

## 2024-03-07 NOTE — TELEPHONE ENCOUNTER
Pt called states he has done well on Jardiance 25 mg insurance no longer covering under his plan. Pt wanting to know if we can send in a appeal. Letter scanned in pt chart 02/23/24 under media. Please see [employer] message from 02/26/24. Please notify pt.

## 2024-03-08 ENCOUNTER — PRIOR AUTHORIZATION (OUTPATIENT)
Dept: ENDOCRINOLOGY | Facility: CLINIC | Age: 79
End: 2024-03-08
Payer: MEDICARE

## 2024-03-08 ENCOUNTER — TELEPHONE (OUTPATIENT)
Dept: ENDOCRINOLOGY | Facility: CLINIC | Age: 79
End: 2024-03-08
Payer: MEDICARE

## 2024-03-08 NOTE — TELEPHONE ENCOUNTER
Spoke with patient.  He states that even with insurance, the farxiga is going to cost $200.  Asking if there is anything else that he could take.

## 2024-03-08 NOTE — TELEPHONE ENCOUNTER
Patient called about his Farxgia rx. He stated that he is having issues with getting this from the pharmacy and he thinks it might be because they still have the Jardiance on file? He is also not sure if he can pay for this and wanted to know if there are any other options? He wanted to discuss with someone. Please advise.

## 2024-03-12 NOTE — TELEPHONE ENCOUNTER
Pharmacy contacted patient and he is coming in to fill out patient assistance paperwork for Farxiga.  RT

## 2024-08-02 NOTE — TELEPHONE ENCOUNTER
Rx Refill Note  Requested Prescriptions     Pending Prescriptions Disp Refills    metFORMIN (GLUCOPHAGE) 1000 MG tablet [Pharmacy Med Name: METFORMIN 1000MG TABLETS] 180 tablet 1     Sig: TAKE 1 TABLET BY MOUTH TWICE DAILY WITH MEALS      Last office visit with prescribing clinician: 2/22/2024    Next office visit with prescribing clinician: 8/26/2024

## 2024-08-13 ENCOUNTER — TELEPHONE (OUTPATIENT)
Dept: ENDOCRINOLOGY | Facility: CLINIC | Age: 79
End: 2024-08-13
Payer: MEDICARE

## 2024-08-13 RX ORDER — INSULIN HUMAN 100 [IU]/ML
INJECTION, SUSPENSION SUBCUTANEOUS
Qty: 40 ML | Refills: 4 | Status: SHIPPED | OUTPATIENT
Start: 2024-08-13

## 2024-08-13 NOTE — TELEPHONE ENCOUNTER
Spoke with patient.  He states he uses vials and he would like rx sent to [facility].

## 2024-08-13 NOTE — TELEPHONE ENCOUNTER
Patient called office, stated that his pharmacy as well as surrounding pharmacies do not have the novolin 70/30 in stock. He is out and wants to know if we have any samples or if we can call in an alternative. He would like a call back.

## 2024-08-13 NOTE — TELEPHONE ENCOUNTER
Yes, we can switch to humulin 70/30 does he want me to send vials or pens to the pharmacy? please verify the pharmacy he wants the prescription to go to as well.  Thanks RT

## 2024-08-26 ENCOUNTER — OFFICE VISIT (OUTPATIENT)
Dept: ENDOCRINOLOGY | Facility: CLINIC | Age: 79
End: 2024-08-26
Payer: MEDICARE

## 2024-08-26 VITALS
OXYGEN SATURATION: 98 % | HEIGHT: 70 IN | HEART RATE: 81 BPM | BODY MASS INDEX: 28.63 KG/M2 | DIASTOLIC BLOOD PRESSURE: 66 MMHG | SYSTOLIC BLOOD PRESSURE: 114 MMHG | WEIGHT: 200 LBS

## 2024-08-26 DIAGNOSIS — Z79.4 TYPE 2 DIABETES MELLITUS WITH DIABETIC POLYNEUROPATHY, WITH LONG-TERM CURRENT USE OF INSULIN: Primary | ICD-10-CM

## 2024-08-26 DIAGNOSIS — E11.42 TYPE 2 DIABETES MELLITUS WITH DIABETIC POLYNEUROPATHY, WITH LONG-TERM CURRENT USE OF INSULIN: Primary | ICD-10-CM

## 2024-08-26 DIAGNOSIS — E03.9 ACQUIRED HYPOTHYROIDISM: ICD-10-CM

## 2024-08-26 LAB
EXPIRATION DATE: ABNORMAL
EXPIRATION DATE: ABNORMAL
GLUCOSE BLDC GLUCOMTR-MCNC: 133 MG/DL (ref 70–130)
HBA1C MFR BLD: 6.4 % (ref 4.5–5.7)
Lab: ABNORMAL
Lab: ABNORMAL

## 2024-08-26 PROCEDURE — 1160F RVW MEDS BY RX/DR IN RCRD: CPT | Performed by: PHYSICIAN ASSISTANT

## 2024-08-26 PROCEDURE — 1159F MED LIST DOCD IN RCRD: CPT | Performed by: PHYSICIAN ASSISTANT

## 2024-08-26 PROCEDURE — 82947 ASSAY GLUCOSE BLOOD QUANT: CPT | Performed by: PHYSICIAN ASSISTANT

## 2024-08-26 PROCEDURE — 3044F HG A1C LEVEL LT 7.0%: CPT | Performed by: PHYSICIAN ASSISTANT

## 2024-08-26 PROCEDURE — 99214 OFFICE O/P EST MOD 30 MIN: CPT | Performed by: PHYSICIAN ASSISTANT

## 2024-08-26 PROCEDURE — 83036 HEMOGLOBIN GLYCOSYLATED A1C: CPT | Performed by: PHYSICIAN ASSISTANT

## 2024-08-26 NOTE — PROGRESS NOTES
"     Office Note      Date: 2024  Patient Name: Remington Alvarado  MRN: [id number]  : 1945    Chief Complaint   Patient presents with    Diabetes       History of Present Illness:   Remington Alvarado is a 78 y.o. male who presents for follow-up for type 2 diabetes diagnosed in .  We switched his Jardiance to Farxiga since last visit and he is getting this through the patient assistance program.  He remains on metformin 1000 mg twice a day and Humulin 70/30 up to 62 units with breakfast and supper (he gets this over-the-counter at [facility]).  He reports overall his blood sugars have been pretty good.  He reports he had 1 or 2 low blood sugars since his last visit.  He denies severe or frequent hypoglycemia.  He is up-to-date on his eye exam he goes annually in January.  He continues to see the podiatrist every 7 weeks.  He continues to see pain management for his neuropathy pain and they are considering putting in a pain pump.  He has an appointment tomorrow for his physical with his primary care physician.  He reports he is due to have lab work done then.  He remains on levothyroxine 88 mcg daily.  His thyroid levels were normal last visit.  He is due for a urine microalbumin/creatinine ratio.      Subjective     Review of Systems:   Review of Systems   Constitutional: Negative.    Cardiovascular: Negative.    Gastrointestinal: Negative.    Endocrine: Negative.    Musculoskeletal:  Positive for myalgias.   Neurological: Negative.        The following portions of the patient's history were reviewed and updated as appropriate: allergies, current medications, past family history, past medical history, past social history, past surgical history, and problem list.    Objective     Vitals:    24 1041   BP: 114/66   BP Location: Left arm   Patient Position: Sitting   Cuff Size: Adult   Pulse: 81   SpO2: 98%   Weight: 90.7 kg (200 lb)   Height: 177.8 cm (70\")     Body mass index is 28.7 " kg/m².    Physical Exam  Vitals reviewed.   Constitutional:       General: He is not in acute distress.     Appearance: Normal appearance.   Neurological:      Mental Status: He is alert.         HEMOGLOBIN A1C  Lab Results   Component Value Date    HGBA1C 6.4 (A) 08/26/2024       GLUCOSE  Glucose   Date Value Ref Range Status   08/26/2024 133 (A) 70 - 130 mg/dL Final       CMP  Lab Results   Component Value Date    GLUCOSE 210 (H) 11/13/2023    BUN 18 11/13/2023    CREATININE 1.36 (H) 11/13/2023    EGFRIFNONA 67 07/26/2021    BCR 13.2 11/13/2023    K 4.9 11/13/2023    CO2 21.0 (L) 11/13/2023    CALCIUM 9.1 11/13/2023    AST 22 11/13/2023    ALT 13 11/13/2023       LIPID PANEL  Lab Results   Component Value Date    CHOL 99 05/09/2023    TRIG 360 (H) 05/09/2023    HDL 27 (L) 05/09/2023    LDL 21 05/09/2023       URINE MICROALBUMIN/CREATININE RATIO  Microalbumin/Creatinine Ratio   Date Value Ref Range Status   10/16/2023   Final     Comment:     Unable to calculate       TSH  Lab Results   Component Value Date    TSH 0.943 02/22/2024       Assessment / Plan      Assessment & Plan:  1. Type 2 diabetes mellitus with diabetic polyneuropathy, with long-term current use of insulin  His hemoglobin A1c today is excellent at 6.4%.  For now we will continue Farxiga 10 mg daily, metformin 1000 mg twice a day and Humulin 70/30 62 units twice a day.  We discussed considering continuous glucose monitoring but he prefers to hold off on this for now.  His weight is stable.  I encouraged healthy eating habits and physical activity as tolerated.  He has labs for a physical coming up tomorrow with his primary care physician I gave him a lab slip to have a CMP and urine microalbumin/creatinine ratio done.  Will plan to send note with results and plan.  His blood pressure is excellent today.  - POC Glucose, Blood  - POC Glycosylated Hemoglobin (Hb A1C)  - T4, Free; Future  - TSH; Future  - Comprehensive Metabolic Panel; Future  -  Microalbumin / Creatinine Urine Ratio - Urine, Clean Catch; Future    2. Acquired hypothyroidism  I gave him a lab slip to have his TFTs checked with his primary care physician tomorrow with his other labs.  For now he will continue levothyroxine 88 mcg daily.  Will send note with results and plan and will refill his levothyroxine once his labs have been reviewed.  - T4, Free; Future  - TSH; Future      Return in about 6 months (around 2/26/2025) for Recheck.     This note was dictated using Dragon voice recognition.    Electronically signed by: SCOTT Sheppard  08/26/2024

## 2024-09-12 RX ORDER — DAPAGLIFLOZIN 10 MG/1
10 TABLET, FILM COATED ORAL EVERY MORNING
Qty: 90 TABLET | Refills: 2 | Status: SHIPPED | OUTPATIENT
Start: 2024-09-12

## 2024-09-12 NOTE — TELEPHONE ENCOUNTER
Specialty Pharmacy Patient Management Program  Prescription Refill Request     Patient currently fills medications at  Pharmacy. Needing refill(s) on the following:      Requested Prescriptions     Pending Prescriptions Disp Refills    dapagliflozin Propanediol (Farxiga) 10 MG tablet 90 tablet 2     Sig: Take 10 mg by mouth Every Morning. TO REPLACE JARIDANCE     Pended for endocrinology provider, Cira Baca PA-C, to review and approve if appropriate.     Sending to [employer] PAP program.     Last Office Visit: 2/22/24  Next Office Visit: 2/27/25    Nela Warren, Pharm.D. BCPS, BCCCP  Clinical Specialty Pharmacist, Endocrinology   08:13 EDT  09/12/24

## 2024-09-24 ENCOUNTER — TRANSCRIBE ORDERS (OUTPATIENT)
Dept: ADMINISTRATIVE | Facility: HOSPITAL | Age: 79
End: 2024-09-24
Payer: MEDICARE

## 2024-09-24 DIAGNOSIS — M54.17 RADICULOPATHY, LUMBOSACRAL REGION: Primary | ICD-10-CM

## 2024-10-21 RX ORDER — INSULIN HUMAN 100 [IU]/ML
INJECTION, SUSPENSION SUBCUTANEOUS
Qty: 60 ML | Refills: 1 | Status: SHIPPED | OUTPATIENT
Start: 2024-10-21

## 2024-10-21 NOTE — TELEPHONE ENCOUNTER
Rx Refill Note  Requested Prescriptions     Pending Prescriptions Disp Refills    insulin NPH-insulin regular (HumuLIN 70/30) (70-30) 100 UNIT/ML injection 40 mL 4     Si units with breakfast and supper. TO REPLACE NOVOLIN 70/30      Last office visit with prescribing clinician: 2024     Next office visit with prescribing clinician: 2025

## 2024-10-25 RX ORDER — LEVOTHYROXINE SODIUM 88 UG/1
88 TABLET ORAL DAILY
Qty: 90 TABLET | Refills: 1 | Status: SHIPPED | OUTPATIENT
Start: 2024-10-25 | End: 2025-10-25

## 2024-10-25 NOTE — TELEPHONE ENCOUNTER
Rx Refill Note  Requested Prescriptions     Pending Prescriptions Disp Refills    metFORMIN (GLUCOPHAGE) 1000 MG tablet [Pharmacy Med Name: METFORMIN 1000MG TABLETS] 180 tablet 0     Sig: TAKE 1 TABLET BY MOUTH TWICE DAILY WITH MEALS    levothyroxine (SYNTHROID, LEVOTHROID) 88 MCG tablet [Pharmacy Med Name: LEVOTHYROXINE 0.088MG (88MCG) TAB] 90 tablet 2     Sig: TAKE 1 TABLET BY MOUTH DAILY      Last office visit with prescribing clinician: 8/26/2024     Next office visit with prescribing clinician: 2/27/2025

## 2024-11-29 ENCOUNTER — APPOINTMENT (OUTPATIENT)
Dept: GENERAL RADIOLOGY | Facility: HOSPITAL | Age: 79
End: 2024-11-29
Payer: MEDICARE

## 2024-11-29 ENCOUNTER — HOSPITAL ENCOUNTER (EMERGENCY)
Facility: HOSPITAL | Age: 79
Discharge: HOME | End: 2024-11-29
Attending: EMERGENCY MEDICINE
Payer: MEDICARE

## 2024-11-29 ENCOUNTER — ANESTHESIA (OUTPATIENT)
Dept: EMERGENCY DEPT | Facility: HOSPITAL | Age: 79
End: 2024-11-29

## 2024-11-29 ENCOUNTER — ANESTHESIA EVENT (OUTPATIENT)
Dept: EMERGENCY DEPT | Facility: HOSPITAL | Age: 79
End: 2024-11-29

## 2024-11-29 VITALS
WEIGHT: 203 LBS | TEMPERATURE: 98.1 F | HEART RATE: 75 BPM | OXYGEN SATURATION: 95 % | RESPIRATION RATE: 18 BRPM | DIASTOLIC BLOOD PRESSURE: 99 MMHG | SYSTOLIC BLOOD PRESSURE: 140 MMHG | HEIGHT: 69 IN | BODY MASS INDEX: 30.07 KG/M2

## 2024-11-29 DIAGNOSIS — I95.9 HYPOTENSION, UNSPECIFIED HYPOTENSION TYPE: Primary | ICD-10-CM

## 2024-11-29 LAB
ALBUMIN SERPL-MCNC: 4.3 G/DL (ref 3.5–5.2)
ALBUMIN/GLOB SERPL: 1.3 G/DL
ALP SERPL-CCNC: 67 U/L (ref 39–117)
ALT SERPL W P-5'-P-CCNC: 10 U/L (ref 1–41)
ANION GAP SERPL CALCULATED.3IONS-SCNC: 16 MMOL/L (ref 5–15)
AST SERPL-CCNC: 13 U/L (ref 1–40)
B PARAPERT DNA SPEC QL NAA+PROBE: NOT DETECTED
B PERT DNA SPEC QL NAA+PROBE: NOT DETECTED
BASOPHILS # BLD AUTO: 0.01 10*3/MM3 (ref 0–0.2)
BASOPHILS NFR BLD AUTO: 0.2 % (ref 0–1.5)
BILIRUB SERPL-MCNC: 0.8 MG/DL (ref 0–1.2)
BILIRUB UR QL STRIP: NEGATIVE
BUN SERPL-MCNC: 24 MG/DL (ref 8–23)
BUN/CREAT SERPL: 17.9 (ref 7–25)
C PNEUM DNA NPH QL NAA+NON-PROBE: NOT DETECTED
CALCIUM SPEC-SCNC: 9.4 MG/DL (ref 8.6–10.5)
CHLORIDE SERPL-SCNC: 103 MMOL/L (ref 98–107)
CLARITY UR: CLEAR
CO2 SERPL-SCNC: 19 MMOL/L (ref 22–29)
COLOR UR: YELLOW
CREAT SERPL-MCNC: 1.34 MG/DL (ref 0.76–1.27)
D-LACTATE SERPL-SCNC: 2.7 MMOL/L (ref 0.5–2)
DEPRECATED RDW RBC AUTO: 50.1 FL (ref 37–54)
EGFRCR SERPLBLD CKD-EPI 2021: 54.6 ML/MIN/1.73
EOSINOPHIL # BLD AUTO: 0.07 10*3/MM3 (ref 0–0.4)
EOSINOPHIL NFR BLD AUTO: 1.1 % (ref 0.3–6.2)
ERYTHROCYTE [DISTWIDTH] IN BLOOD BY AUTOMATED COUNT: 14.8 % (ref 12.3–15.4)
FLUAV SUBTYP SPEC NAA+PROBE: NOT DETECTED
FLUBV RNA ISLT QL NAA+PROBE: NOT DETECTED
GEN 5 2HR TROPONIN T REFLEX: 17 NG/L
GLOBULIN UR ELPH-MCNC: 3.3 GM/DL
GLUCOSE SERPL-MCNC: 163 MG/DL (ref 65–99)
GLUCOSE UR STRIP-MCNC: ABNORMAL MG/DL
HADV DNA SPEC NAA+PROBE: NOT DETECTED
HCOV 229E RNA SPEC QL NAA+PROBE: NOT DETECTED
HCOV HKU1 RNA SPEC QL NAA+PROBE: NOT DETECTED
HCOV NL63 RNA SPEC QL NAA+PROBE: NOT DETECTED
HCOV OC43 RNA SPEC QL NAA+PROBE: NOT DETECTED
HCT VFR BLD AUTO: 40.3 % (ref 37.5–51)
HGB BLD-MCNC: 12.1 G/DL (ref 13–17.7)
HGB UR QL STRIP.AUTO: NEGATIVE
HMPV RNA NPH QL NAA+NON-PROBE: NOT DETECTED
HOLD SPECIMEN: NORMAL
HPIV1 RNA ISLT QL NAA+PROBE: NOT DETECTED
HPIV2 RNA SPEC QL NAA+PROBE: NOT DETECTED
HPIV3 RNA NPH QL NAA+PROBE: NOT DETECTED
HPIV4 P GENE NPH QL NAA+PROBE: NOT DETECTED
IMM GRANULOCYTES # BLD AUTO: 0.04 10*3/MM3 (ref 0–0.05)
IMM GRANULOCYTES NFR BLD AUTO: 0.6 % (ref 0–0.5)
INR PPP: 1.11 (ref 0.89–1.12)
KETONES UR QL STRIP: ABNORMAL
LEUKOCYTE ESTERASE UR QL STRIP.AUTO: NEGATIVE
LYMPHOCYTES # BLD AUTO: 0.59 10*3/MM3 (ref 0.7–3.1)
LYMPHOCYTES NFR BLD AUTO: 9 % (ref 19.6–45.3)
M PNEUMO IGG SER IA-ACNC: NOT DETECTED
MAGNESIUM SERPL-MCNC: 2 MG/DL (ref 1.6–2.4)
MCH RBC QN AUTO: 27.6 PG (ref 26.6–33)
MCHC RBC AUTO-ENTMCNC: 30 G/DL (ref 31.5–35.7)
MCV RBC AUTO: 92 FL (ref 79–97)
MONOCYTES # BLD AUTO: 0.77 10*3/MM3 (ref 0.1–0.9)
MONOCYTES NFR BLD AUTO: 11.7 % (ref 5–12)
NEUTROPHILS NFR BLD AUTO: 5.09 10*3/MM3 (ref 1.7–7)
NEUTROPHILS NFR BLD AUTO: 77.4 % (ref 42.7–76)
NITRITE UR QL STRIP: NEGATIVE
NRBC BLD AUTO-RTO: 0 /100 WBC (ref 0–0.2)
NT-PROBNP SERPL-MCNC: 146.9 PG/ML (ref 0–1800)
PH UR STRIP.AUTO: 5.5 [PH] (ref 5–8)
PLATELET # BLD AUTO: 153 10*3/MM3 (ref 140–450)
PMV BLD AUTO: 9.4 FL (ref 6–12)
POTASSIUM SERPL-SCNC: 5.2 MMOL/L (ref 3.5–5.2)
PROCALCITONIN SERPL-MCNC: 0.1 NG/ML (ref 0–0.25)
PROT SERPL-MCNC: 7.6 G/DL (ref 6–8.5)
PROT UR QL STRIP: ABNORMAL
PROTHROMBIN TIME: 14.5 SECONDS (ref 12.2–14.5)
RBC # BLD AUTO: 4.38 10*6/MM3 (ref 4.14–5.8)
RHINOVIRUS RNA SPEC NAA+PROBE: NOT DETECTED
RSV RNA NPH QL NAA+NON-PROBE: NOT DETECTED
SARS-COV-2 RNA NPH QL NAA+NON-PROBE: NOT DETECTED
SODIUM SERPL-SCNC: 138 MMOL/L (ref 136–145)
SP GR UR STRIP: 1.04 (ref 1–1.03)
T4 FREE SERPL-MCNC: 1.22 NG/DL (ref 0.92–1.68)
TROPONIN T DELTA: -1 NG/L
TROPONIN T SERPL HS-MCNC: 18 NG/L
TSH SERPL DL<=0.05 MIU/L-ACNC: 1.46 UIU/ML (ref 0.27–4.2)
UROBILINOGEN UR QL STRIP: ABNORMAL
WBC NRBC COR # BLD AUTO: 6.57 10*3/MM3 (ref 3.4–10.8)
WHOLE BLOOD HOLD COAG: NORMAL
WHOLE BLOOD HOLD SPECIMEN: NORMAL

## 2024-11-29 PROCEDURE — 36415 COLL VENOUS BLD VENIPUNCTURE: CPT

## 2024-11-29 PROCEDURE — 85025 COMPLETE CBC W/AUTO DIFF WBC: CPT | Performed by: EMERGENCY MEDICINE

## 2024-11-29 PROCEDURE — 83605 ASSAY OF LACTIC ACID: CPT | Performed by: EMERGENCY MEDICINE

## 2024-11-29 PROCEDURE — 93005 ELECTROCARDIOGRAM TRACING: CPT | Performed by: EMERGENCY MEDICINE

## 2024-11-29 PROCEDURE — 93005 ELECTROCARDIOGRAM TRACING: CPT

## 2024-11-29 PROCEDURE — 85610 PROTHROMBIN TIME: CPT | Performed by: EMERGENCY MEDICINE

## 2024-11-29 PROCEDURE — 99284 EMERGENCY DEPT VISIT MOD MDM: CPT

## 2024-11-29 PROCEDURE — 84439 ASSAY OF FREE THYROXINE: CPT | Performed by: EMERGENCY MEDICINE

## 2024-11-29 PROCEDURE — 84484 ASSAY OF TROPONIN QUANT: CPT | Performed by: EMERGENCY MEDICINE

## 2024-11-29 PROCEDURE — 84145 PROCALCITONIN (PCT): CPT | Performed by: EMERGENCY MEDICINE

## 2024-11-29 PROCEDURE — 81003 URINALYSIS AUTO W/O SCOPE: CPT | Performed by: EMERGENCY MEDICINE

## 2024-11-29 PROCEDURE — 84443 ASSAY THYROID STIM HORMONE: CPT | Performed by: EMERGENCY MEDICINE

## 2024-11-29 PROCEDURE — 99285 EMERGENCY DEPT VISIT HI MDM: CPT

## 2024-11-29 PROCEDURE — 25810000003 LACTATED RINGERS SOLUTION: Performed by: EMERGENCY MEDICINE

## 2024-11-29 PROCEDURE — 0202U NFCT DS 22 TRGT SARS-COV-2: CPT | Performed by: EMERGENCY MEDICINE

## 2024-11-29 PROCEDURE — 83880 ASSAY OF NATRIURETIC PEPTIDE: CPT | Performed by: EMERGENCY MEDICINE

## 2024-11-29 PROCEDURE — 83735 ASSAY OF MAGNESIUM: CPT | Performed by: EMERGENCY MEDICINE

## 2024-11-29 PROCEDURE — 71045 X-RAY EXAM CHEST 1 VIEW: CPT

## 2024-11-29 PROCEDURE — 80053 COMPREHEN METABOLIC PANEL: CPT | Performed by: EMERGENCY MEDICINE

## 2024-11-29 RX ORDER — SODIUM CHLORIDE 9 MG/ML
10 INJECTION, SOLUTION INTRAMUSCULAR; INTRAVENOUS; SUBCUTANEOUS AS NEEDED
Status: DISCONTINUED | OUTPATIENT
Start: 2024-11-29 | End: 2024-11-29 | Stop reason: HOSPADM

## 2024-11-29 RX ADMIN — SODIUM CHLORIDE, SODIUM LACTATE, POTASSIUM CHLORIDE, CALCIUM CHLORIDE 1000 ML: 20; 30; 600; 310 INJECTION, SOLUTION INTRAVENOUS at 15:08

## 2024-11-29 NOTE — ED PROVIDER NOTES
Subjective   History of Present Illness    Pt presents for generalized weakness.  He says he slept in his recliner last night, as he sometimes does.  This morning he was too weak to get out of the chair and called EMS.  They brought him in as a STEMI alert.  Patient has history of CAD with CABG.  He denies chest pain, dyspnea, sweating, nausea or dizziness today.  He felt weak all over, like he couldn't move arms or legs.  He denies any focality.  He denies any recent medication change.    EMS reports pt's BP was 85/64 on their arrival, they gave saline bolus and BP came up to normal range.  Pt reported feeling better after BP improvement.    I reviewed EMS EKG showing NSR without STEMI changes.  They have a few single-lead rhythm strips showing some ST elevation in V1 and V2 but these are not present on the full EKG.    EKG here read by me NSR with anterior Q waves and no acute ischemic findings.  This EKG was discussed in real time with cardiologist Dr Burgess.    History provided by:  Patient and EMS personnel      Review of Systems    Past Medical History:   Diagnosis Date    Arrhythmia     Coronary artery disease     Disease of thyroid gland     GERD (gastroesophageal reflux disease)     Heart attack     Heart disease     Hyperlipidemia     Hypertension     Hypothyroidism     Neuropathy due to type 2 diabetes mellitus     Peripheral neuropathy     Pure hyperglyceridemia     Type 2 diabetes mellitus        Allergies   Allergen Reactions    Sulfa Antibiotics        Past Surgical History:   Procedure Laterality Date    BACK SURGERY      CARDIAC CATHETERIZATION      CARDIAC CATHETERIZATION N/A 01/04/2021    Procedure: Left Heart Cath;  Surgeon: Martin Arreguin MD;  Location:  [facility] CATH INVASIVE LOCATION;  Service: Cardiovascular;  Laterality: N/A;    CARDIAC CATHETERIZATION N/A 06/25/2021    Procedure: Left Heart Cath;  Surgeon: Martin Arreguin MD;  Location:  [facility] CATH INVASIVE LOCATION;  Service:  Cardiovascular;  Laterality: N/A;    CARDIAC CATHETERIZATION N/A 5/10/2023    Procedure: Left Heart Cath;  Surgeon: Sang Eason MD;  Location: [facility] CATH INVASIVE LOC[facility];  Service: Cardiology;  Laterality: N/A;    CARDIAC DEFIBRILLATOR PLACEMENT      CARDIAC SURGERY      CHOLECYSTECTOMY      CORONARY ANGIOPLASTY      CORONARY ARTERY BYPASS GRAFT      CORONARY STENT PLACEMENT      HERNIA REPAIR      MOUTH SURGERY      ORIF ULNA/RADIUS FRACTURES      SPINAL CORD STIMULATOR IMPLANT      SPINAL CORD STIMULATOR REMOVAL         Family History   Problem Relation Age of Onset    Diabetes Mother     Heart attack Father     Heart disease Father     Stroke Father     Hypertension Father     Diabetes Father     Heart attack Brother     Heart disease Brother     Hypertension Brother     Obesity Brother     Heart attack Paternal Uncle     Heart disease Paternal Uncle        Social History     Socioeconomic History    Marital status:    Tobacco Use    Smoking status: Former     Current packs/day: 0.00     Types: Cigarettes     Quit date: 1989     Years since quittin.5    Smokeless tobacco: Never    Tobacco comments:     Quit 40 years ago   Vaping Use    Vaping status: Never Used   Substance and Sexual Activity    Alcohol use: Not Currently    Drug use: Never    Sexual activity: Not Currently     Birth control/protection: Abstinence, Vasectomy           Objective   Physical Exam  Vitals and nursing note reviewed.   Constitutional:       General: He is not in acute distress.     Appearance: Normal appearance. He is not ill-appearing.   HENT:      Head: Normocephalic and atraumatic.      Mouth/Throat:      Mouth: Mucous membranes are moist.   Eyes:      General: No scleral icterus.        Right eye: No discharge.         Left eye: No discharge.      Conjunctiva/sclera: Conjunctivae normal.   Cardiovascular:      Rate and Rhythm: Normal rate and regular rhythm.      Heart sounds: No murmur heard.  Pulmonary:      " Effort: Pulmonary effort is normal. No respiratory distress.      Breath sounds: Normal breath sounds. No wheezing.   Abdominal:      General: Bowel sounds are normal. There is no distension.      Palpations: Abdomen is soft.      Tenderness: There is no abdominal tenderness. There is no guarding or rebound.   Musculoskeletal:         General: No swelling. Normal range of motion.      Cervical back: Normal range of motion and neck supple.   Skin:     General: Skin is warm and dry.      Findings: No rash.   Neurological:      General: No focal deficit present.      Mental Status: He is alert and oriented to person, place, and time. Mental status is at baseline.      Comments: Speech fluent and articulate.  No facial droop.  5/5 strength in arms and legs.  Normal .  Mentation normal.   Psychiatric:         Mood and Affect: Mood normal.         Behavior: Behavior normal.         Thought Content: Thought content normal.         Procedures           ED Course    I reviewed outside records.  PCP note 10/29/24 notes history of ICD for cardiomyopathy, CHF, CHD, \"blood clots in brain\", HTN, HL, hypothyroidism, DM peripheral neuropathy.  Heart cath 5/10/23 notes patent grafts.  Echo 5/10/23 LVEF 31-35%.      Borderline BP on arrival.  Saline given and BP normalized and stayed up.  He was able to walk int he ED without difficulty and reports feeling back to normal.    Labs show a mild metabolic acidosis with stable renal function.  Normal troponins.    EKG read by me NSR.    CXR read by me no acute process.      He feels better and would like to go home. He says he slept most of the day yesterday, skipped Thanksgiving dinner and didn't eat or drink all day. I think this is reasonable.    Patient stable on serial rechecks.  Discussed findings, concerns, plan of care, expected course, reasons to return and followup.  Provided the opportunity to ask questions.                                                       Medical " Decision Making  Problems Addressed:  Hypotension, unspecified hypotension type: complicated acute illness or injury with systemic symptoms that poses a threat to life or bodily functions    Amount and/or Complexity of Data Reviewed  Independent Historian: EMS  External Data Reviewed: notes.  Labs: ordered. Decision-making details documented in ED Course.  Radiology: ordered and independent interpretation performed. Decision-making details documented in ED Course.  ECG/medicine tests: ordered and independent interpretation performed. Decision-making details documented in ED Course.  Discussion of management or test interpretation with external provider(s): cardiology    Risk  Prescription drug management.  Decision regarding hospitalization.        Final diagnoses:   Hypotension, unspecified hypotension type       ED Disposition  ED Disposition       ED Disposition   Discharge    Condition   Stable    Comment   --               Disha Velez, DO  [address] [facility] [address] [place] [id number] [phone]    In 3 days      [facility] EMERGENCY DEPARTMENT  [address] [facility] [address] [phone]    If symptoms worsen         Medication List        Changed      sacubitril-valsartan 24-26 MG tablet  Commonly known as: ENTRESTO  Take 1 tablet by mouth Every 12 (Twelve) Hours.  What changed: when to take this                 Alfredo Marquez MD  11/29/24 1740

## 2024-11-30 LAB
QT INTERVAL: 372 MS
QTC INTERVAL: 450 MS

## 2024-12-18 NOTE — ED PROVIDER NOTES
Subjective   History of Present Illness  This is a 79-year-old male presenting to the emergency department in cardiac arrest.  EMS brought the patient in after being called out for chest pain call.  Apparently when EMS arrived, the patient was complaining of severe chest pain.  He had taken nitroglycerin prior to arrival.  When EMS arrived, the patient went unresponsive.  They placed him on the monitor and he was found to be in PEA.  ACLS protocol was initiated at this time.  The patient was intubated by EMS in the field.  Patient cannot provide any other history given his current clinical status    History provided by:  EMS personnel  History limited by:  Intubated, acuity of condition and patient unresponsive   used: No        Review of Systems   Unable to perform ROS: Intubated       Past Medical History:   Diagnosis Date    Arrhythmia     Coronary artery disease     Disease of thyroid gland     GERD (gastroesophageal reflux disease)     Heart attack     Heart disease     Hyperlipidemia     Hypertension     Hypothyroidism     Neuropathy due to type 2 diabetes mellitus     Peripheral neuropathy     Pure hyperglyceridemia     Type 2 diabetes mellitus        Allergies   Allergen Reactions    Sulfa Antibiotics        Past Surgical History:   Procedure Laterality Date    BACK SURGERY      CARDIAC CATHETERIZATION      CARDIAC CATHETERIZATION N/A 01/04/2021    Procedure: Left Heart Cath;  Surgeon: Martin Arreguin MD;  Location:  [facility] CATH INVASIVE LOCATION;  Service: Cardiovascular;  Laterality: N/A;    CARDIAC CATHETERIZATION N/A 06/25/2021    Procedure: Left Heart Cath;  Surgeon: Martin Arreguin MD;  Location:  [facility] CATH INVASIVE LOCATION;  Service: Cardiovascular;  Laterality: N/A;    CARDIAC CATHETERIZATION N/A 5/10/2023    Procedure: Left Heart Cath;  Surgeon: Sang Eason MD;  Location:  [facility] CATH INVASIVE LOCATION;  Service: Cardiology;  Laterality: N/A;    CARDIAC  DEFIBRILLATOR PLACEMENT      CARDIAC SURGERY      CHOLECYSTECTOMY      CORONARY ANGIOPLASTY      CORONARY ARTERY BYPASS GRAFT      CORONARY STENT PLACEMENT      HERNIA REPAIR      MOUTH SURGERY      ORIF ULNA/RADIUS FRACTURES      SPINAL CORD STIMULATOR IMPLANT      SPINAL CORD STIMULATOR REMOVAL         Family History   Problem Relation Age of Onset    Diabetes Mother     Heart attack Father     Heart disease Father     Stroke Father     Hypertension Father     Diabetes Father     Heart attack Brother     Heart disease Brother     Hypertension Brother     Obesity Brother     Heart attack Paternal Uncle     Heart disease Paternal Uncle        Social History     Socioeconomic History    Marital status:    Tobacco Use    Smoking status: Former     Current packs/day: 0.00     Types: Cigarettes     Quit date: 1989     Years since quittin.6    Smokeless tobacco: Never    Tobacco comments:     Quit 40 years ago   Vaping Use    Vaping status: Never Used   Substance and Sexual Activity    Alcohol use: Not Currently    Drug use: Never    Sexual activity: Not Currently     Birth control/protection: Abstinence, Vasectomy           Objective   Physical Exam  Vitals and nursing note reviewed. Exam conducted with a chaperone present.   Constitutional:       General: He is in acute distress.      Appearance: He is toxic-appearing.   HENT:      Head: Atraumatic.      Mouth/Throat:      Pharynx: Oropharynx is clear.   Eyes:      Comments: Pupils are fixed and dilated, no extraocular movements   Cardiovascular:      Comments: Pulseless  Pulmonary:      Comments: No spontaneous respirations  Abdominal:      General: Abdomen is flat.   Musculoskeletal:      Comments: No spontaneous movement or deformity   Skin:     General: Skin is dry.   Neurological:      Comments: No underlying neurologic function.  Unresponsive         CPR    Date/Time: 2024 2:20 AM    Performed by: Benedict Hoang MD  Authorized by:  Benedict Hoang MD    Comments:      CPR Procedure    Upon my arrival the patient was in cardiopulmonary arrest, cardiopulmonary resuscitation (CPR) was in progress, the patient was being ventilated with a bag valve mask and 100% oxygen, and ACLS protocol had been initiated: see code flowsheet.  Rhythm on arrival:pulseless electrical activity and no cardiac wall motion on ultrasound  Interventions:Ventilation: Patient was intubated by EMS and respirations assisted with bag-valve-mask  Femoral pulse was not noted during CPR without use of doppler.  ACLS protocol followed: see resuscitation flowsheet.  Patient received numerous interventions including epinephrine, calcium, bicarb, amiodarone and thrombolytics  Outcome: The resuscitation was unsuccessful and the patient was pronounced at 0127.   notified.  Discussed with family.                         ED Course  ED Course as of 12/18/24 0224   Wed Dec 18, 2024   0221 POC CHEM 8(!!)  Interpretation:  Laboratory studies were reviewed and interpreted directly by myself.  Chem-8 showed glucose 414, acute kidney injury with a creatinine 1.5, hyperkalemia with potassium 5.8 [JK]   0222 The patient was seen by myself and treated as a medical code in the resuscitation bay. The patient was received from EMS with active CPR in progress. ACLS protocol was followed. CPR was continued while the patient was placed on a cardiac monitor. Respirations were assisted via intubation and bag-valve-mask. Pulse check revealed no palpable pulses. Rhythm was noted to be PEA on the cardiac monitor. CPR was resumed.     The patient received numerous rounds of ACLS protocol with continuous CPR. No vital signs were obtainable. No return of spontaneous circulation. Bedside ultrasound revealed complete cardiac standstill. All members of the code team were in agreement that further resuscitation was futile. Time of death was called at 0127. The patient's family was notified of death.  Social work and  support was provided [JK]      ED Course User Index  [JK] Benedict Hoang MD                                                       Medical Decision Making  This is a 79-year-old male with a history of CAD and hypertension presenting to the emergency department in cardiac arrest.  The patient was endorsing of chest pain called EMS.  When they arrived he had a witnessed cardiac arrest.  Initial rhythm was PEA.  Patient was intubated in the field.  ACLS protocols were continued in around 1 transferred to the emergency department.      Differential diagnosis: Acute cardiac arrest, acute respiratory failure, STEMI, PE, anemia, acute kidney injury, electrolyte abnormality    Amount and/or Complexity of Data Reviewed  Independent Historian: EMS     Details: Did have discussion with EMS about the original presentation and interventions prior to arrival  External Data Reviewed: labs, radiology, ECG and notes.     Details: External laboratories, imaging as well as notes were reviewed personally by myself.  All relevant studies were used to guide decision making.     Date of previous record: 10/1/2024    Source of note: Cardiology    Summary:  Patient was seen and evaluated for routine visit.  I did review basic laboratory studies on file as well as a previous chest x-ray and EKG.  Records reviewed    Labs: ordered. Decision-making details documented in ED Course.  Radiology: ordered and independent interpretation performed. Decision-making details documented in ED Course.  ECG/medicine tests: ordered and independent interpretation performed. Decision-making details documented in ED Course.    Risk  Prescription drug management.        Final diagnoses:   Cardiac arrest   Acute respiratory failure, unspecified whether with hypoxia or hypercapnia   History of CAD (coronary artery disease)   History of chronic hypertension   Dyslipidemia       ED Disposition  ED Disposition       ED Disposition        Condition   --    Comment   --               No follow-up provider specified.       Medication List        Changed      sacubitril-valsartan 24-26 MG tablet  Commonly known as: ENTRESTO  Take 1 tablet by mouth Every 12 (Twelve) Hours.  What changed: when to take this                 Benedict Hoang MD  [id number]
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
## 2025-06-23 NOTE — TELEPHONE ENCOUNTER
Patient last seen on 6-16-20 and no appt scheduled   [Initial Evaluation] : an initial evaluation [FreeTextEntry1] :  CATINA

## (undated) DEVICE — KT VLV HEMO MAP ACC PLS LG/BORE MTL/INTRO W/TORQ/DEV

## (undated) DEVICE — PINNACLE INTRODUCER SHEATH: Brand: PINNACLE

## (undated) DEVICE — CATH DIAG EXPO M/ PK 5F FL4/FR4 PIG

## (undated) DEVICE — SKIN PREP TRAY W/CHG: Brand: MEDLINE INDUSTRIES, INC.

## (undated) DEVICE — GW INQWIRE FC PTFE STD J/1.5 .035 260

## (undated) DEVICE — PK CATH CARD 10

## (undated) DEVICE — DEV INFL MONARCH 25W

## (undated) DEVICE — MODEL AT P65, P/N 701554-001KIT CONTENTS: HAND CONTROLLER, 3-WAY HIGH-PRESSURE STOPCOCK WITH ROTATING END AND PREMIUM HIGH-PRESSURE TUBING: Brand: ANGIOTOUCH® KIT

## (undated) DEVICE — DEV COMP RAD PRELUDESYNC 24CM

## (undated) DEVICE — BALN EUPHORA 2.5X15MM

## (undated) DEVICE — ST INF PRI SMRTSTE 20DRP 2VLV 24ML 117

## (undated) DEVICE — MODEL BT2000 P/N 700287-012KIT CONTENTS: MANIFOLD WITH SALINE AND CONTRAST PORTS, SALINE TUBING WITH SPIKE AND HAND SYRINGE, TRANSDUCER: Brand: BT2000 AUTOMATED MANIFOLD KIT

## (undated) DEVICE — CATH DIAG EXPO .045 FL3  5F 100CM

## (undated) DEVICE — INTRO SHEATH PRELUDE IDEAL SPRNG COIL 021 6F 23X80CM

## (undated) DEVICE — GW PERIPH VASC ADX J/TP SS .035 150CM 3MM

## (undated) DEVICE — ANGIOGRAPHIC CATHETER: Brand: EXPO™

## (undated) DEVICE — GUIDE CATHETER: Brand: MACH1™

## (undated) DEVICE — CORONARY IMAGING CATHETER: Brand: OPTICROSS™ 6 HD

## (undated) DEVICE — CVR PROB ULTRASND/TRANSD W/GEL 7X11IN STRL

## (undated) DEVICE — DEV COMPR RADL PRELUDESYNCEZ 30ML 32CM

## (undated) DEVICE — BALN EUPHORA 3X15MM

## (undated) DEVICE — KODAMA CATHETER, P/N 701470CONTENTS: IMAGING CATHETER, 3 ML SYRINGE, 10 ML SYRINGE, EXTENSION SET, STERILE BAG, IFU: Brand: ACIST KODAMA® CORONARY IMAGING CATHETER

## (undated) DEVICE — CATH DIAG EXPO .045 FL3.5 5F 100CM

## (undated) DEVICE — ANGIO-SEAL VIP VASCULAR CLOSURE DEVICE: Brand: ANGIO-SEAL

## (undated) DEVICE — RUNTHROUGH NS EXTRA FLOPPY PTCA GUIDEWIRE: Brand: RUNTHROUGH

## (undated) DEVICE — GLIDESHEATH SLENDER STAINLESS STEEL KIT: Brand: GLIDESHEATH SLENDER